# Patient Record
Sex: MALE | Race: WHITE | NOT HISPANIC OR LATINO | Employment: OTHER | URBAN - NONMETROPOLITAN AREA
[De-identification: names, ages, dates, MRNs, and addresses within clinical notes are randomized per-mention and may not be internally consistent; named-entity substitution may affect disease eponyms.]

---

## 2017-02-17 ENCOUNTER — OFFICE VISIT (OUTPATIENT)
Dept: RETAIL CLINIC | Facility: CLINIC | Age: 62
End: 2017-02-17

## 2017-02-17 VITALS — TEMPERATURE: 98.3 F | HEART RATE: 79 BPM | OXYGEN SATURATION: 97 % | WEIGHT: 200.6 LBS | RESPIRATION RATE: 18 BRPM

## 2017-02-17 DIAGNOSIS — J01.00 ACUTE MAXILLARY SINUSITIS, RECURRENCE NOT SPECIFIED: Primary | ICD-10-CM

## 2017-02-17 PROCEDURE — 99213 OFFICE O/P EST LOW 20 MIN: CPT | Performed by: NURSE PRACTITIONER

## 2017-02-17 RX ORDER — AMOXICILLIN 875 MG/1
875 TABLET, COATED ORAL 2 TIMES DAILY
Qty: 20 TABLET | Refills: 0 | Status: SHIPPED | OUTPATIENT
Start: 2017-02-17 | End: 2017-02-27

## 2017-02-17 RX ORDER — METHYLPREDNISOLONE 4 MG/1
TABLET ORAL
Qty: 21 TABLET | Refills: 0 | Status: SHIPPED | OUTPATIENT
Start: 2017-02-17 | End: 2019-09-10

## 2017-02-17 NOTE — PATIENT INSTRUCTIONS
You have been diagnosed with a sinus infection.  An antibiotic has been prescribed and should be taken until completely gone.  While on an antibiotic it is recommended that you take a form of probiotic such as yogurt or a pill form each day.  Use saline nasal spray for congestion  mucinex dm will help with congestion and cough  You should follow up with your family doctor, dr. Cristofer arias, if fever, increasing sinus pain or worsening.

## 2017-02-17 NOTE — PROGRESS NOTES
Subjective   Phill Madison is a 61 y.o. male.   Chief Complaint   Patient presents with   • Sinusitis      History of Present Illness   Several days with sinus drainage, bladimir and cough.  Much pressure in cheek area.  He has had no fever.  Hx of recurrent sinus infections.  Last was in 12/16.  He is taking no meds for the symptoms.  He has had no nvd  He has a hx of allergies.    The following portions of the patient's history were reviewed and updated as appropriate: allergies, current medications, past family history, past medical history, past social history, past surgical history and problem list.    Review of Systems   General:  Neg for fever  Neg for fatigue  Pos for illness exposure  Hent:  Pos for sinus bladimir and drainage  Pos for pain in sinuses  Neg for ear pain   Neg for sore throat  Lungs;  Pos for cough  Neg for sob  Pos for hx of asthma  Gi:  Neg for nvd  Neg for change in appetite    Objective   Allergies   Allergen Reactions   • Sulfa Antibiotics Hives       Physical Exam   General:  Awake and alert  nad  Does not appear acutely ill  Hent:  tms intact  No ejection  Light reflex present  Post pharynx with mild ejection.  No exudate or swelling  Uvula midline    Neck:  Supple  No lymphadenopathy  Lungs;  cta  No use of accessory muscles  No ext cyanosis  Card:  ahr with rrr  No ectopy  No jvd  Skin:  Warm and dry  No cyanosis    Assessment/Plan   Phill was seen today for sinusitis.    Diagnoses and all orders for this visit:    Acute maxillary sinusitis, recurrence not specified  -     amoxicillin (AMOXIL) 875 MG tablet; Take 1 tablet by mouth 2 (Two) Times a Day for 10 days.  -     MethylPREDNISolone (MEDROL, SANDY,) 4 MG tablet; Take as directed on package instructions.

## 2019-09-10 ENCOUNTER — OFFICE VISIT (OUTPATIENT)
Dept: CARDIOLOGY | Facility: CLINIC | Age: 64
End: 2019-09-10

## 2019-09-10 VITALS
OXYGEN SATURATION: 97 % | HEIGHT: 71 IN | HEART RATE: 60 BPM | DIASTOLIC BLOOD PRESSURE: 69 MMHG | BODY MASS INDEX: 26.46 KG/M2 | SYSTOLIC BLOOD PRESSURE: 113 MMHG | WEIGHT: 189 LBS

## 2019-09-10 DIAGNOSIS — I45.10 RBBB: ICD-10-CM

## 2019-09-10 DIAGNOSIS — R55 SYNCOPE AND COLLAPSE: ICD-10-CM

## 2019-09-10 DIAGNOSIS — R00.2 PALPITATIONS: ICD-10-CM

## 2019-09-10 DIAGNOSIS — E78.2 MIXED HYPERLIPIDEMIA: Primary | ICD-10-CM

## 2019-09-10 DIAGNOSIS — R06.09 DYSPNEA ON EXERTION: ICD-10-CM

## 2019-09-10 PROCEDURE — 93000 ELECTROCARDIOGRAM COMPLETE: CPT | Performed by: INTERNAL MEDICINE

## 2019-09-10 PROCEDURE — 99204 OFFICE O/P NEW MOD 45 MIN: CPT | Performed by: INTERNAL MEDICINE

## 2019-09-10 RX ORDER — DILTIAZEM HYDROCHLORIDE 120 MG/1
120 CAPSULE, COATED, EXTENDED RELEASE ORAL DAILY
Refills: 6 | COMMUNITY
Start: 2019-08-30 | End: 2019-09-10

## 2019-09-10 RX ORDER — DICYCLOMINE HCL 20 MG
20 TABLET ORAL 3 TIMES DAILY PRN
Refills: 1 | COMMUNITY
Start: 2019-08-01 | End: 2020-01-27

## 2019-09-10 RX ORDER — PANTOPRAZOLE SODIUM 20 MG/1
20 TABLET, DELAYED RELEASE ORAL DAILY
Refills: 4 | COMMUNITY
Start: 2019-08-30 | End: 2020-01-27

## 2019-09-10 NOTE — PATIENT INSTRUCTIONS
"Fat and Cholesterol Restricted Eating Plan  Getting too much fat and cholesterol in your diet may cause health problems. Choosing the right foods helps keep your fat and cholesterol at normal levels. This can keep you from getting certain diseases.  Your doctor may recommend an eating plan that includes:  · Total fat: ______% or less of total calories a day.  · Saturated fat: ______% or less of total calories a day.  · Cholesterol: less than _________mg a day.  · Fiber: ______g a day.  What are tips for following this plan?  General tips    · Work with your doctor to lose weight if you need to.  · Avoid:  ? Foods with added sugar.  ? Fried foods.  ? Foods with partially hydrogenated oils.  · Limit alcohol intake to no more than 1 drink a day for nonpregnant women and 2 drinks a day for men. One drink equals 12 oz of beer, 5 oz of wine, or 1½ oz of hard liquor.  Reading food labels  · Check food labels for:  ? Trans fats.  ? Partially hydrogenated oils.  ? Saturated fat (g) in each serving.  ? Cholesterol (mg) in each serving.  ? Fiber (g) in each serving.  · Choose foods with healthy fats, such as:  ? Monounsaturated fats.  ? Polyunsaturated fats.  ? Omega-3 fats.  · Choose grain products that have whole grains. Look for the word \"whole\" as the first word in the ingredient list.  Cooking  · Cook foods using low-fat methods. These include baking, boiling, grilling, and broiling.  · Eat more home-cooked foods. Eat at restaurants and buffets less often.  · Avoid cooking using saturated fats, such as butter, cream, palm oil, palm kernel oil, and coconut oil.  Meal planning    · At meals, divide your plate into four equal parts:  ? Fill one-half of your plate with vegetables and green salads.  ? Fill one-fourth of your plate with whole grains.  ? Fill one-fourth of your plate with low-fat (lean) protein foods.  · Eat fish that is high in omega-3 fats at least two times a week. This includes mackerel, tuna, sardines, and " salmon.  · Eat foods that are high in fiber, such as whole grains, beans, apples, broccoli, carrots, peas, and barley.  Recommended foods  Grains  · Whole grains, such as whole wheat or whole grain breads, crackers, cereals, and pasta. Unsweetened oatmeal, bulgur, barley, quinoa, or brown rice. Corn or whole wheat flour tortillas.  Vegetables  · Fresh or frozen vegetables (raw, steamed, roasted, or grilled). Green salads.  Fruits  · All fresh, canned (in natural juice), or frozen fruits.  Meats and other protein foods  · Ground beef (85% or leaner), grass-fed beef, or beef trimmed of fat. Skinless chicken or turkey. Ground chicken or turkey. Pork trimmed of fat. All fish and seafood. Egg whites. Dried beans, peas, or lentils. Unsalted nuts or seeds. Unsalted canned beans. Nut butters without added sugar or oil.  Dairy  · Low-fat or nonfat dairy products, such as skim or 1% milk, 2% or reduced-fat cheeses, low-fat and fat-free ricotta or cottage cheese, or plain low-fat and nonfat yogurt.  Fats and oils  · Tub margarine without trans fats. Light or reduced-fat mayonnaise and salad dressings. Avocado. Olive, canola, sesame, or safflower oils.  The items listed above may not be a complete list of recommended foods or beverages. Contact your dietitian for more options.  The items listed above may not be a complete list of foods and beverages [you/your child] can eat. Contact a dietitian for more information.  Foods to avoid  Grains  · White bread. White pasta. White rice. Cornbread. Bagels, pastries, and croissants. Crackers and snack foods that contain trans fat and hydrogenated oils.  Vegetables  · Vegetables cooked in cheese, cream, or butter sauce. Fried vegetables.  Fruits  · Canned fruit in heavy syrup. Fruit in cream or butter sauce. Fried fruit.  Meats and other protein foods  · Fatty cuts of meat. Ribs, chicken wings, pastrana, sausage, bologna, salami, chitterlings, fatback, hot dogs, bratwurst, and packaged  lunch meats. Liver and organ meats. Whole eggs and egg yolks. Chicken and turkey with skin. Fried meat.  Dairy  · Whole or 2% milk, cream, half-and-half, and cream cheese. Whole milk cheeses. Whole-fat or sweetened yogurt. Full-fat cheeses. Nondairy creamers and whipped toppings. Processed cheese, cheese spreads, and cheese curds.  Beverages  · Alcohol. Sugar-sweetened drinks such as sodas, lemonade, and fruit drinks.  Fats and oils  · Butter, stick margarine, lard, shortening, ghee, or pastrana fat. Coconut, palm kernel, and palm oils.  Sweets and desserts  · Corn syrup, sugars, honey, and molasses. Candy. Jam and jelly. Syrup. Sweetened cereals. Cookies, pies, cakes, donuts, muffins, and ice cream.  The items listed above may not be a complete list of foods and beverages to avoid. Contact your dietitian for more information.  The items listed above may not be a complete list of foods and beverages [you/your child] should avoid. Contact a dietitian for more information.  Summary  · Choosing the right foods helps keep your fat and cholesterol at normal levels. This can keep you from getting certain diseases.  · At meals, fill one-half of your plate with vegetables and green salads.  · Eat high-fiber foods, like whole grains, beans, apples, carrots, peas, and barley.  · Limit added sugar, saturated fats, alcohol, and fried foods.  This information is not intended to replace advice given to you by your health care provider. Make sure you discuss any questions you have with your health care provider.  Document Released: 06/18/2013 Document Revised: 09/04/2018 Document Reviewed: 09/04/2018  [employer] Interactive Patient Education © 2019 [employer]  BMI for Adults    Body mass index (BMI) is a number that is calculated from a person's weight and height. BMI may help to estimate how much of a person's weight is composed of fat. BMI can help identify those who may be at higher risk for certain medical problems.  How is BMI  "used with adults?  BMI is used as a screening tool to identify possible weight problems. It is used to check whether a person is obese, overweight, healthy weight, or underweight.  How is BMI calculated?  BMI measures your weight and compares it to your height. This can be done either in English ([place] or metric measurements. Note that charts are available to help you find your BMI quickly and easily without having to do these calculations yourself.  To calculate your BMI in English ([place] measurements, your health care provider will:  1. Measure your weight in pounds (lb).  2. Multiply the number of pounds by 703.  ? For example, for a person who weighs 180 lb, multiply that number by 703, which equals 126,540.  3. Measure your height in inches (in). Then multiply that number by itself to get a measurement called \"inches squared.\"  ? For example, for a person who is 70 in tall, the \"inches squared\" measurement is 70 in x 70 in, which equals 4900 inches squared.  4. Divide the total from Step 2 (number of lb x 703) by the total from Step 3 (inches squared): 126,540 ÷ 4900 = 25.8. This is your BMI.  To calculate your BMI in metric measurements, your health care provider will:  1. Measure your weight in kilograms (kg).  2. Measure your height in meters (m). Then multiply that number by itself to get a measurement called \"meters squared.\"  ? For example, for a person who is 1.75 m tall, the \"meters squared\" measurement is 1.75 m x 1.75 m, which is equal to 3.1 meters squared.  3. Divide the number of kilograms (your weight) by the meters squared number. In this example: 70 ÷ 3.1 = 22.6. This is your BMI.  How is BMI interpreted?  To interpret your results, your health care provider will use BMI charts to identify whether you are underweight, normal weight, overweight, or obese. The following guidelines will be used:  · Underweight: BMI less than 18.5.  · Normal weight: BMI between 18.5 and 24.9.  · Overweight: BMI " between 25 and 29.9.  · Obese: BMI of 30 and above.  Please note:  · Weight includes both fat and muscle, so someone with a muscular build, such as an athlete, may have a BMI that is higher than 24.9. In cases like these, BMI is not an accurate measure of body fat.  · To determine if excess body fat is the cause of a BMI of 25 or higher, further assessments may need to be done by a health care provider.  · BMI is usually interpreted in the same way for men and women.  Why is BMI a useful tool?  BMI is useful in two ways:  · Identifying a weight problem that may be related to a medical condition, or that may increase the risk for medical problems.  · Promoting lifestyle and diet changes in order to reach a healthy weight.  Summary  · Body mass index (BMI) is a number that is calculated from a person's weight and height.  · BMI may help to estimate how much of a person's weight is composed of fat. BMI can help identify those who may be at higher risk for certain medical problems.  · BMI can be measured using English measurements or metric measurements.  · To interpret your results, your health care provider will use BMI charts to identify whether you are underweight, normal weight, overweight, or obese.  This information is not intended to replace advice given to you by your health care provider. Make sure you discuss any questions you have with your health care provider.  Document Released: 08/29/2005 Document Revised: 10/31/2018 Document Reviewed: 10/31/2018  [employer] Interactive Patient Education © 2019 [employer]

## 2019-09-10 NOTE — PROGRESS NOTES
Subjective   Phill Madison is a 64 y.o. male     Chief Complaint   Patient presents with   • Establish Care     Here to establish care    • Hyperlipidemia   • Palpitations       PROBLEM LIST:     1. Hyperlipidemia  2. VALLE  3. Palpitations  4. Syncope and collapse, last episode approx. 2 yrs. ago  5. Asthma 25 yrs. ago, reports cured with acupuncture  6. RBBB per EKG                      Specialty Problems     None            HPI:  Mr. Phill Madison is a 64-year-old male patient of Luci Singh who presents today for second opinion.    Proximally 2 years ago the patient had an episode of syncope.  He was in his basement, felt a rapid graying out, and awoke on the floor.  There is no antecedent palpitation, chest pain, or dyspnea.  The patient suffered no injury.  He awoke without Los's paralysis or post ictal state.  He had no history of fainting child, no orthostatic dizziness, and he has had no symptoms since that episode.  Mr. Madison saw Dr. Afshan Everett who performed echocardiography, stress testing, and event monitor.  The only information the patient can relate is that he was told he had a valve it leaked.  Mr. Madison was ultimately sent for tilt table testing which, per his report, was positive.  Apparently he was started on diltiazem in that timeframe.    Mr. Madison has had no dizziness, presyncope, or syncope since the index event described above.  Additionally, he denies chest pain, orthopnea, PND, or lower extremity edema.  He denies palpitations, symptoms of arterial embolic events, or symptoms of peripheral arterial disease.  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  He states that he is somewhat more fatigued at the end of the day than he was several years ago.  However, he relates this to his diltiazem use.  Mr. Madison describes that he feels generalized anergy and fatigue since diltiazem was started.  The patient was  never diagnosed with hypertension prior to the initiation of that medication.  Therefore, presumptively, eye exam was initiated either because of tilt table findings or event monitor although the patient cannot describe being told he had any abnormal heart rhythms.                      CURRENT MEDICATION:    Current Outpatient Medications   Medication Sig Dispense Refill   • dicyclomine (BENTYL) 20 MG tablet Take 20 mg by mouth 3 (Three) Times a Day As Needed.  1   • diltiaZEM CD (CARDIZEM CD) 120 MG 24 hr capsule Take 120 mg by mouth Daily.  6   • pantoprazole (PROTONIX) 20 MG EC tablet Take 20 mg by mouth Daily.  4     No current facility-administered medications for this visit.        ALLERGIES:    Sulfa antibiotics    PAST MEDICAL HISTORY:    Past Medical History:   Diagnosis Date   • Allergic    • Arthritis    • Asthma    • VALLE (dyspnea on exertion)    • Hyperlipidemia    • Palpitation    • Syncope and collapse        SURGICAL HISTORY:    Past Surgical History:   Procedure Laterality Date   • COLONOSCOPY     • FOOT SURGERY     • ROTATOR CUFF REPAIR      x 2       SOCIAL HISTORY:    Social History     Socioeconomic History   • Marital status:      Spouse name: Not on file   • Number of children: Not on file   • Years of education: Not on file   • Highest education level: Not on file   Tobacco Use   • Smoking status: Never Smoker   Substance and Sexual Activity   • Alcohol use: No   • Drug use: No       FAMILY HISTORY:    Family History   Problem Relation Age of Onset   • Heart disease Father    • Diabetes Father    • Heart disease Mother        Review of Systems   Constitutional: Positive for fatigue (Less stamina in the last 2-3 years since starting on Diltiazem ) and unexpected weight change (weight loss over 6 mo. r/t ? IBS). Negative for chills and fever.   HENT: Negative.    Eyes: Positive for visual disturbance (glasses daily ).   Respiratory: Positive for shortness of breath (SOA with exertion such  "as climbing stairs. Started about 2 years ago). Negative for chest tightness.         Denies orthopnea/PND   Cardiovascular: Positive for palpitations (Occasional palps for 2-3 years ). Negative for chest pain and leg swelling.   Gastrointestinal: Negative.  Negative for blood in stool (no melena,hematuria,hemoptysis,hematochezia), constipation and diarrhea.        Urgency for BM after eating   Endocrine: Negative.  Negative for cold intolerance and heat intolerance.   Genitourinary: Positive for difficulty urinating (low stream at times ). Negative for hematuria.   Musculoskeletal: Positive for arthralgias (joints ). Negative for back pain.        Leg cramps in bed, denies with ambulation   Skin: Negative.  Negative for rash and wound.   Allergic/Immunologic: Negative.    Neurological: Positive for light-headedness (when standing fast). Negative for dizziness and weakness.        First started seeing Dr. Afshan Everett after a syncopal episode a few years ago. No syncopal episodes since then.   Denies stroke like sx's   Hematological: Negative.    Psychiatric/Behavioral: Negative.  Negative for sleep disturbance (Denies waking with smothering ).       VISIT VITALS:  Vitals:    09/10/19 1304   BP: 113/69   BP Location: Left arm   Patient Position: Sitting   Pulse: 60   SpO2: 97%   Weight: 85.7 kg (189 lb)   Height: 180.3 cm (71\")      /69 (BP Location: Left arm, Patient Position: Sitting)   Pulse 60   Ht 180.3 cm (71\")   Wt 85.7 kg (189 lb)   SpO2 97%   BMI 26.36 kg/m²     RECENT LABS:    Objective       Physical Exam   Constitutional: He is oriented to person, place, and time. He appears well-developed and well-nourished. No distress.   HENT:   Head: Normocephalic and atraumatic.   No oral lesions   Eyes: Conjunctivae are normal. Pupils are equal, round, and reactive to light. Left eye exhibits nystagmus.   No ptosis or lid lag  Extra ocular motions intact  CORRY  3-4 beats nystaguym on left lateral gaze   "   Neck: Normal range of motion. Neck supple. No hepatojugular reflux and no JVD present. Carotid bruit is not present. No tracheal deviation present.   Nl. Carotid upstrokes     Cardiovascular: Normal rate, regular rhythm, S1 normal, S2 normal and intact distal pulses. Exam reveals gallop and S4 (very soft). Exam reveals no S3 and no friction rub.   No murmur heard.  Pulses:       Radial pulses are 2+ on the right side, and 2+ on the left side.   Pulmonary/Chest: Effort normal and breath sounds normal. He has no wheezes. He has no rhonchi. He has no rales.   Nl. Expir. Phase  Nl. Breath sound intensity       Abdominal: Soft. Bowel sounds are normal. He exhibits no distension, no abdominal bruit and no mass. There is no tenderness. There is no rebound and no guarding.   No organomegaly   Musculoskeletal: Normal range of motion. He exhibits no edema, tenderness or deformity.   LLE, No edema, nl. Cap. Refill,  Nl. Pedal pulses  RLE, No edema, nl. Cap. Refill,  Nl. Pedal pulses   Neurological: He is alert and oriented to person, place, and time.   Skin: Skin is warm and dry. No rash noted. No erythema. No pallor.   Psychiatric: He has a normal mood and affect. His behavior is normal. Judgment and thought content normal.   Nursing note and vitals reviewed.        ECG 12 Lead  Date/Time: 9/10/2019 1:33 PM  Performed by: Seth Davidson MD  Authorized by: Seth Davidson MD   Comparison: not compared with previous ECG   Previous ECG: no previous ECG available  Comments: Sinus bradycardia at 53 bpm.  Right bundle branch block with normal axis.  Minor intra-atrial conduction delay.              Assessment/Plan   #1.  Syncope.  The patient had a singular isolated event of unknown cause.  Per his report he had a positive tilt table test but he denies ever having any symptoms to suggest neurocardiogenic events.  Therefore, I doubt that the tilt table was a true positive study.    2.  Energy and fatigue since starting  diltiazem.  I think that it would be reasonable to stop that medication and follow symptoms and blood pressures closely.  Mr. Madison was never hypertensive prior to the use of that medication, and unless he had a very unusual rhythm on event monitor (i.e. RV outflow tract V. tach etc.) I see no indication for its use.  Therefore, Mr. Madison will discontinue that medication and follow blood pressures over the next several weeks calling results to us.    3.  The patient's physical exam is entirely normal today.  Therefore    4.  Mr. Madison will follow with Luci Singh as instructed, and he will follow in our office on a as needed basis for symptoms or blood pressures as discussed in detail today.  I do not believe the patient needs to restrict his physical activity in any way.   Diagnosis Plan   1. Mixed hyperlipidemia     2. Dyspnea on exertion     3. Palpitations  ECG 12 Lead   4. Syncope and collapse  ECG 12 Lead   5. RBBB         No Follow-up on file.       Patient's Body mass index is 26.36 kg/m². BMI is above normal parameters. Recommendations include: educational material and referral to primary care.       Heidy Lester LPN    Scribed for Dr. Seth Davidson by Heidy Lester LPN September 10, 2019 1:51 PM         Electronically signed by:            This note is dictated utilizing voice recognition software.  Although this record has been proof read, transcriptional errors may still be present. If questions occur regarding the content of this record please do not hesitate to call our office.

## 2019-09-30 ENCOUNTER — DOCUMENTATION (OUTPATIENT)
Dept: CARDIOLOGY | Facility: CLINIC | Age: 64
End: 2019-09-30

## 2020-01-27 ENCOUNTER — OFFICE VISIT (OUTPATIENT)
Dept: CARDIOLOGY | Facility: CLINIC | Age: 65
End: 2020-01-27

## 2020-01-27 VITALS
OXYGEN SATURATION: 96 % | BODY MASS INDEX: 28.06 KG/M2 | DIASTOLIC BLOOD PRESSURE: 81 MMHG | SYSTOLIC BLOOD PRESSURE: 132 MMHG | WEIGHT: 200.4 LBS | HEART RATE: 68 BPM | HEIGHT: 71 IN

## 2020-01-27 DIAGNOSIS — E78.2 MIXED HYPERLIPIDEMIA: Primary | ICD-10-CM

## 2020-01-27 DIAGNOSIS — R06.09 DYSPNEA ON EXERTION: ICD-10-CM

## 2020-01-27 DIAGNOSIS — R55 SYNCOPE AND COLLAPSE: ICD-10-CM

## 2020-01-27 DIAGNOSIS — R00.2 PALPITATIONS: ICD-10-CM

## 2020-01-27 DIAGNOSIS — N52.9 ERECTILE DYSFUNCTION, UNSPECIFIED ERECTILE DYSFUNCTION TYPE: ICD-10-CM

## 2020-01-27 DIAGNOSIS — I45.10 RBBB: ICD-10-CM

## 2020-01-27 PROCEDURE — 99213 OFFICE O/P EST LOW 20 MIN: CPT | Performed by: INTERNAL MEDICINE

## 2020-01-27 RX ORDER — TADALAFIL 20 MG/1
20 TABLET ORAL DAILY PRN
Qty: 15 TABLET | Refills: 2 | Status: SHIPPED | OUTPATIENT
Start: 2020-01-27 | End: 2022-04-15

## 2020-01-27 RX ORDER — TADALAFIL 5 MG/1
TABLET ORAL
COMMUNITY
Start: 2019-11-20 | End: 2020-01-27 | Stop reason: SDUPTHER

## 2020-01-27 RX ORDER — DILTIAZEM HYDROCHLORIDE 120 MG/1
CAPSULE, COATED, EXTENDED RELEASE ORAL
COMMUNITY
Start: 2019-12-18 | End: 2020-01-27

## 2020-01-27 NOTE — PATIENT INSTRUCTIONS
2 pt identifiers. PPD skin test, negative 0mm lt forearm. Pt with no complaints.   Obesity, Adult  Obesity is the condition of having too much total body fat. Being overweight or obese means that your weight is greater than what is considered healthy for your body size. Obesity is determined by a measurement called BMI. BMI is an estimate of body fat and is calculated from height and weight. For adults, a BMI of 30 or higher is considered obese.  Obesity can lead to other health concerns and major illnesses, including:  · Stroke.  · Coronary artery disease (CAD).  · Type 2 diabetes.  · Some types of cancer, including cancers of the colon, breast, uterus, and gallbladder.  · Osteoarthritis.  · High blood pressure (hypertension).  · High cholesterol.  · Sleep apnea.  · Gallbladder stones.  · Infertility problems.  What are the causes?  Common causes of this condition include:  · Eating daily meals that are high in calories, sugar, and fat.  · Being born with genes that may make you more likely to become obese.  · Having a medical condition that causes obesity, including:  ? Hypothyroidism.  ? Polycystic ovarian syndrome (PCOS).  ? Binge-eating disorder.  ? Cushing syndrome.  · Taking certain medicines, such as steroids, antidepressants, and seizure medicines.  · Not being physically active (sedentary lifestyle).  · Not getting enough sleep.  · Drinking high amounts of sugar-sweetened beverages, such as soft drinks.  What increases the risk?  The following factors may make you more likely to develop this condition:  · Having a family history of obesity.  · Being a woman of  descent.  · Being a man of  descent.  · Living in an area with limited access to:  ? [place], recreation centers, or sidewalks.  ? Healthy food choices, such as grocery stores and farmers' markets.  What are the signs or symptoms?  The main sign of this condition is having too much body fat.  How is this diagnosed?  This condition is diagnosed based on:  · Your BMI. If you are an adult with a BMI of 30 or  higher, you are considered obese.  · Your waist circumference. This measures the distance around your waistline.  · Your skinfold thickness. Your health care provider may gently pinch a fold of your skin and measure it.  You may have other tests to check for underlying conditions.  How is this treated?  Treatment for this condition often includes changing your lifestyle. Treatment may include some or all of the following:  · Dietary changes. This may include developing a healthy meal plan.  · Regular physical activity. This may include activity that causes your heart to beat faster (aerobic exercise) and strength training. Work with your health care provider to design an exercise program that works for you.  · Medicine to help you lose weight if you are unable to lose 1 pound a week after 6 weeks of healthy eating and more physical activity.  · Treating conditions that cause the obesity (underlying conditions).  · Surgery. Surgical options may include gastric banding and gastric bypass. Surgery may be done if:  ? Other treatments have not helped to improve your condition.  ? You have a BMI of 40 or higher.  ? You have life-threatening health problems related to obesity.  Follow these instructions at home:  Eating and drinking    · Follow recommendations from your health care provider about what you eat and drink. Your health care provider may advise you to:  ? Limit fast food, sweets, and processed snack foods.  ? Choose low-fat options, such as low-fat milk instead of whole milk.  ? Eat 5 or more servings of fruits or vegetables every day.  ? Eat at home more often. This gives you more control over what you eat.  ? Choose healthy foods when you eat out.  ? Learn to read food labels. This will help you understand how much food is considered 1 serving.  ? Learn what a healthy serving size is.  ? Keep low-fat snacks available.  ? Limit sugary drinks, such as soda, fruit juice, sweetened iced tea, and flavored  milk.  · Drink enough water to keep your urine pale yellow.  · Do not follow a fad diet. Fad diets can be unhealthy and even dangerous.  Physical activity  · Exercise regularly, as told by your health care provider.  ? Most adults should get up to 150 minutes of moderate-intensity exercise every week.  ? Ask your health care provider what types of exercise are safe for you and how often you should exercise.  · Warm up and stretch before being active.  · Cool down and stretch after being active.  · Rest between periods of activity.  Lifestyle  · Work with your health care provider and a dietitian to set a weight-loss goal that is healthy and reasonable for you.  · Limit your screen time.  · Find ways to reward yourself that do not involve food.  · Do not drink alcohol if:  ? Your health care provider tells you not to drink.  ? You are pregnant, may be pregnant, or are planning to become pregnant.  · If you drink alcohol:  ? Limit how much you use to:  § 0-1 drink a day for women.  § 0-2 drinks a day for men.  ? Be aware of how much alcohol is in your drink. In the [place] one drink equals one 12 oz bottle of beer (355 mL), one 5 oz glass of wine (148 mL), or one 1½ oz glass of hard liquor (44 mL).  General instructions  · Keep a weight-loss journal to keep track of the food you eat and how much exercise you get.  · Take over-the-counter and prescription medicines only as told by your health care provider.  · Take vitamins and supplements only as told by your health care provider.  · Consider joining a support group. Your health care provider may be able to recommend a support group.  · Keep all follow-up visits as told by your health care provider. This is important.  Contact a health care provider if:  · You are unable to meet your weight loss goal after 6 weeks of dietary and lifestyle changes.  Get help right away if you are having:  · Trouble breathing.  · Suicidal thoughts or behaviors.  Summary  · Obesity is the  condition of having too much total body fat.  · Being overweight or obese means that your weight is greater than what is considered healthy for your body size.  · Work with your health care provider and a dietitian to set a weight-loss goal that is healthy and reasonable for you.  · Exercise regularly, as told by your health care provider. Ask your health care provider what types of exercise are safe for you and how often you should exercise.  This information is not intended to replace advice given to you by your health care provider. Make sure you discuss any questions you have with your health care provider.  Document Released: 01/25/2006 Document Revised: 08/22/2019 Document Reviewed: 08/22/2019  [employer] Interactive Patient Education © 2019 [employer]  MyPlate from [place] [facility]    MyPlate is an outline of a general healthy diet based on the 2010 Dietary Guidelines for Americans, from the [place] Department of Agriculture (USDA). It sets guidelines for how much food you should eat from each food group based on your age, sex, and level of physical activity.  What are tips for following MyPlate?  To follow MyPlate recommendations:  · Eat a wide variety of fruits and vegetables, grains, and protein foods.  · Serve smaller portions and eat less food throughout the day.  · Limit portion sizes to avoid overeating.  · Enjoy your food.  · Get at least 150 minutes of exercise every week. This is about 30 minutes each day, 5 or more days per week.  It can be difficult to have every meal look like MyPlate. Think about MyPlate as eating guidelines for an entire day, rather than each individual meal.  Fruits and vegetables  · Make half of your plate fruits and vegetables.  · Eat many different colors of fruits and vegetables each day.  · For a 2,000 calorie daily food plan, eat:  ? 2½ cups of vegetables every day.  ? 2 cups of fruit every day.  · 1 cup is equal to:  ? 1 cup raw or cooked vegetables.  ? 1 cup raw fruit.  ? 1  medium-sized orange, apple, or banana.  ? 1 cup 100% fruit or vegetable juice.  ? 2 cups raw leafy greens, such as lettuce, spinach, or kale.  ? ½ cup dried fruit.  Grains  · One fourth of your plate should be grains.  · Make at least half of the grains you eat each day whole grains.  · For a 2,000 calorie daily food plan, eat 6 oz of grains every day.  · 1 oz is equal to:  ? 1 slice bread.  ? 1 cup cereal.  ? ½ cup cooked rice, cereal, or pasta.  Protein  · One fourth of your plate should be protein.  · Eat a wide variety of protein foods, including meat, poultry, fish, eggs, beans, nuts, and tofu.  · For a 2,000 calorie daily food plan, eat 5½ oz of protein every day.  · 1 oz is equal to:  ? 1 oz meat, poultry, or fish.  ? ¼ cup cooked beans.  ? 1 egg.  ? ½ oz nuts or seeds.  ? 1 Tbsp peanut butter.  Dairy  · Drink fat-free or low-fat (1%) milk.  · Eat or drink dairy as a side to meals.  · For a 2,000 calorie daily food plan, eat or drink 3 cups of dairy every day.  · 1 cup is equal to:  ? 1 cup milk, yogurt, cottage cheese, or soy milk (soy beverage).  ? 2 oz processed cheese.  ? 1½ oz natural cheese.  Fats, oils, salt, and sugars  · Only small amounts of oils are recommended.  · Avoid foods that are high in calories and low in nutritional value (empty calories), like foods high in fat or added sugars.  · Choose foods that are low in salt (sodium). Choose foods that have less than 140 milligrams (mg) of sodium per serving.  · Drink water instead of sugary drinks. Drink enough water each day to keep your urine pale yellow.  Where to find support  · Work with your health care provider or a nutrition specialist (dietitian) to develop a customized eating plan that is right for you.  · Download an esau (mobile application) to help you track your daily food intake.  Where to find more information  · Go to ChooseMyPlate.gov for more information.  · Learn more and log your daily food intake according to MyPlate using  USDA's SuperTracker: www.supertracker.usda.gov  Summary  · MyPlate is a general guideline for healthy eating from the USDA. It is based on the 2010 Dietary Guidelines for Americans.  · In general, fruits and vegetables should take up ½ of your plate, grains should take up ¼ of your plate, and protein should take up ¼ of your plate.  This information is not intended to replace advice given to you by your health care provider. Make sure you discuss any questions you have with your health care provider.  Document Released: 01/06/2009 Document Revised: 03/19/2018 Document Reviewed: 03/19/2018  [employer] Interactive Patient Education © 2019 [employer]

## 2020-01-27 NOTE — PROGRESS NOTES
Subjective   Phill Madison is a 64 y.o. male     Chief Complaint   Patient presents with   • Hyperlipidemia   • Palpitations   • Loss of Consciousness   • Shortness of Breath       PROBLEM LIST:     1. Hyperlipidemia  2. VALLE  3. Palpitations  4. Syncope and collapse, last episode approx. 2 yrs. Ago  4.1 Tlit Table, 8-1-17, pos. For neurocardiogenic syncope  5. Asthma 25 yrs. ago, reports cured with acupuncture  6. RBBB per EKG                        Specialty Problems        Cardiology Problems    Mixed hyperlipidemia        Palpitations        RBBB        Syncope and collapse                HPI:  Mr. Madison returns for follow-up on the above.    He stopped diltiazem as requested and felt that he had more strength and stamina.  He had less energy and fatigue.  He did notice rare episodes of sensed extrasystoles.  He had no spontaneous dizziness, presyncope, or syncope.  He has had mild orthostatic lightheadedness in the past which did not change.  Blood pressures remained very well controlled.    Mr. Madison also describes that his most recent use of Cialis did not resulted in improvement of erectile dysfunction.    I reviewed the patient's previous echo, stress test, and tilt table.  Notably, Mr. Madison had no symptoms when upright.  He developed dizziness with relative hypotension and bradycardia only when he was placed in the 45 degree position.  As described previously, he has never had symptoms compatible with neurocardiogenic syncope anytime in the past.                        PRIOR MEDICATIONS    Current Outpatient Medications on File Prior to Visit   Medication Sig Dispense Refill   • dilTIAZem CD (CARDIZEM CD) 120 MG 24 hr capsule Ordered daily takes most days     • Probiotic Product (PROBIOTIC PO) Take  by mouth 1 (One) Time Per Week.     • tadalafil (CIALIS) 5 MG tablet prn     • [DISCONTINUED] dicyclomine (BENTYL) 20 MG tablet Take 20 mg by mouth 3 (Three) Times a Day As Needed.  1   • [DISCONTINUED]  "pantoprazole (PROTONIX) 20 MG EC tablet Take 20 mg by mouth Daily.  4     No current facility-administered medications on file prior to visit.        ALLERGIES:    Sulfa antibiotics    PAST MEDICAL HISTORY:    Past Medical History:   Diagnosis Date   • Allergic    • Arthritis    • Asthma    • VALLE (dyspnea on exertion)    • Hyperlipidemia    • Palpitation    • Syncope and collapse        SURGICAL HISTORY:    Past Surgical History:   Procedure Laterality Date   • COLONOSCOPY     • FOOT SURGERY     • ROTATOR CUFF REPAIR      x 2       SOCIAL HISTORY:    Social History     Socioeconomic History   • Marital status:      Spouse name: Not on file   • Number of children: Not on file   • Years of education: Not on file   • Highest education level: Not on file   Tobacco Use   • Smoking status: Never Smoker   • Smokeless tobacco: Never Used   Substance and Sexual Activity   • Alcohol use: No   • Drug use: No       FAMILY HISTORY:    Family History   Problem Relation Age of Onset   • Heart disease Father    • Diabetes Father    • Heart disease Mother        Review of Systems   Constitutional: Positive for fatigue (than usual).   HENT: Negative.    Eyes: Positive for visual disturbance (wears glasses).   Respiratory: Positive for shortness of breath (with hills).    Cardiovascular: Positive for palpitations (left chest). Negative for chest pain and leg swelling.   Gastrointestinal: Negative.    Endocrine: Negative.    Genitourinary: Negative.    Musculoskeletal: Negative.    Skin: Negative.    Allergic/Immunologic: Negative.    Neurological: Positive for dizziness (occas.).   Hematological: Negative.    Psychiatric/Behavioral: Negative.        VISIT VITALS:  Vitals:    01/27/20 1108   BP: 132/81   BP Location: Left arm   Patient Position: Sitting   Pulse: 68   SpO2: 96%   Weight: 90.9 kg (200 lb 6.4 oz)   Height: 180.3 cm (70.98\")      /81 (BP Location: Left arm, Patient Position: Sitting)   Pulse 68   Ht 180.3 cm " "(70.98\")   Wt 90.9 kg (200 lb 6.4 oz)   SpO2 96%   BMI 27.96 kg/m²     RECENT LABS:    Objective       Physical Exam    Procedures      Assessment/Plan   #1.  Energy and fatigue.  The patient felt better off diltiazem and had no symptoms of neurocardiogenic presyncope or syncope.  He will stop that medication and follow blood pressures closely.    2.  We will prescribe Cialis 20 mg daily.  If medications are ineffective I would recommend urologic evaluation.    3.  Mr. Madison will report any symptoms of presyncope or syncope.  Otherwise, he will follow with Dr. Story as instructed, and in our office on a yearly PRN basis.   Diagnosis Plan   1. Mixed hyperlipidemia     2. Palpitations     3. RBBB     4. Syncope and collapse     5. Dyspnea on exertion       Stopping Diltiazem to see if fatigue and shortness of breath improves. Monitor b/p and h/r call with any sx's. MEGAN,LPN    No follow-ups on file.         Phill Madison  reports that he has never smoked. He has never used smokeless tobacco.. I have educated him on the risk of diseases from using tobacco products such as cancer, COPD and heart diease.               Patient's Body mass index is 27.96 kg/m². BMI is above normal parameters. Recommendations include: educational material and referral to primary care.       Heidy Lester LPN    Scribed for Dr. Seth Davidson by Heidy Lester LPN January 27, 2020 11:14 AM         Electronically signed by:            This note is dictated utilizing voice recognition software.  Although this record has been proof read, transcriptional errors may still be present. If questions occur regarding the content of this record please do not hesitate to call our office.     "

## 2020-01-29 ENCOUNTER — DOCUMENTATION (OUTPATIENT)
Dept: CARDIOLOGY | Facility: CLINIC | Age: 65
End: 2020-01-29

## 2020-01-29 NOTE — PROGRESS NOTES
NABILS PRESCRIBED AT LAST VISIR REQUIRES A PA THROUGH HIS INSURANCE. PER DR. LAU, DO NOT PA, HAVE FAMILY PCP ADDRESS/OTHER OPTIONS. CALLED AND L/M  NUMBER WITH ABOVE. PH,LPN

## 2021-01-27 ENCOUNTER — OFFICE VISIT (OUTPATIENT)
Dept: CARDIOLOGY | Facility: CLINIC | Age: 66
End: 2021-01-27

## 2021-01-27 VITALS
BODY MASS INDEX: 27.97 KG/M2 | SYSTOLIC BLOOD PRESSURE: 131 MMHG | HEIGHT: 71 IN | WEIGHT: 199.8 LBS | OXYGEN SATURATION: 98 % | DIASTOLIC BLOOD PRESSURE: 77 MMHG | HEART RATE: 69 BPM

## 2021-01-27 DIAGNOSIS — Z99.89 OSA ON CPAP: ICD-10-CM

## 2021-01-27 DIAGNOSIS — I45.10 RBBB: ICD-10-CM

## 2021-01-27 DIAGNOSIS — G47.33 OSA ON CPAP: ICD-10-CM

## 2021-01-27 DIAGNOSIS — R55 SYNCOPE AND COLLAPSE: ICD-10-CM

## 2021-01-27 DIAGNOSIS — R00.2 PALPITATIONS: ICD-10-CM

## 2021-01-27 DIAGNOSIS — E78.2 MIXED HYPERLIPIDEMIA: ICD-10-CM

## 2021-01-27 DIAGNOSIS — R06.09 DYSPNEA ON EXERTION: Primary | ICD-10-CM

## 2021-01-27 PROCEDURE — 99214 OFFICE O/P EST MOD 30 MIN: CPT | Performed by: INTERNAL MEDICINE

## 2021-01-27 PROCEDURE — 93000 ELECTROCARDIOGRAM COMPLETE: CPT | Performed by: INTERNAL MEDICINE

## 2021-01-27 NOTE — PROGRESS NOTES
Subjective   Phill Madison is a 65 y.o. male     Chief Complaint   Patient presents with   • Follow-up     Here for yearly f/u   • Hyperlipidemia   • Loss of Consciousness       PROBLEM LIST:     1. Hyperlipidemia  2. VALLE  3. Palpitations  4. Syncope and collapse, last episode approx. 2 yrs. Ago  4.1 Tlit Table, 8-1-17, pos. For neurocardiogenic syncope  5. Asthma 25 yrs. ago, reports cured with acupuncture  6. RBBB per EKG  7. CODY, uses CPAP      Specialty Problems        Cardiology Problems    Mixed hyperlipidemia        Palpitations        RBBB        Syncope and collapse                HPI:  Mr. Madison returns for follow-up on the above.    He has done well since the time of his last visit.  He feels that his mood and energy have improved over the last year.  He still has occasional sensed extrasystoles and short runs, and he has occasional episodes of lightheadedness which are not orthostatic, positional, nor are they associated with palpitations.  Symptoms have been stable over years.    Mr. Madison specifically denies symptoms compatible with angina.  He also denies orthopnea, PND, or lower extremity edema.  He has had no symptoms of arterial embolic events to include no symptoms of TIA or stroke, and he has had no bleeding.  Blood pressures have been controlled.                        PRIOR MEDICATIONS    Current Outpatient Medications on File Prior to Visit   Medication Sig Dispense Refill   • Probiotic Product (PROBIOTIC PO) Take  by mouth 1 (One) Time Per Week.     • tadalafil (CIALIS) 20 MG tablet Take 1 tablet by mouth Daily As Needed for Erectile Dysfunction. prn 15 tablet 2     No current facility-administered medications on file prior to visit.        ALLERGIES:    Sulfa antibiotics    PAST MEDICAL HISTORY:    Past Medical History:   Diagnosis Date   • Allergic    • Arthritis    • Asthma    • VALLE (dyspnea on exertion)    • Hyperlipidemia    • Palpitation    • Syncope and collapse        SURGICAL  "HISTORY:    Past Surgical History:   Procedure Laterality Date   • COLONOSCOPY     • FOOT SURGERY     • ROTATOR CUFF REPAIR      x 2       SOCIAL HISTORY:    Social History     Socioeconomic History   • Marital status:      Spouse name: Not on file   • Number of children: Not on file   • Years of education: Not on file   • Highest education level: Not on file   Tobacco Use   • Smoking status: Never Smoker   • Smokeless tobacco: Never Used   Substance and Sexual Activity   • Alcohol use: No   • Drug use: No       FAMILY HISTORY:    Family History   Problem Relation Age of Onset   • Heart disease Father    • Diabetes Father    • Heart disease Mother        Review of Systems   Constitutional:        Improved strength and stamina compared to a year ago   HENT: Negative.    Eyes: Positive for visual disturbance (glasses).   Respiratory: Positive for shortness of breath (with hills).         Denies orthopnea/PND   Cardiovascular: Positive for palpitations (occas. flutter). Negative for chest pain and leg swelling.   Gastrointestinal: Negative.    Endocrine: Negative.    Genitourinary: Negative.    Musculoskeletal: Positive for arthralgias and myalgias.   Skin: Negative.    Allergic/Immunologic: Negative.    Neurological: Positive for light-headedness (occas.).   Hematological: Negative.    Psychiatric/Behavioral: Negative.        VISIT VITALS:  Vitals:    01/27/21 0926   BP: 131/77   BP Location: Left arm   Patient Position: Sitting   Pulse: 69   SpO2: 98%   Weight: 90.6 kg (199 lb 12.8 oz)   Height: 180.3 cm (71\")      /77 (BP Location: Left arm, Patient Position: Sitting)   Pulse 69   Ht 180.3 cm (71\")   Wt 90.6 kg (199 lb 12.8 oz)   SpO2 98%   BMI 27.87 kg/m²     RECENT LABS:    Objective       Physical Exam  Vitals signs and nursing note reviewed.   Constitutional:       General: He is not in acute distress.     Appearance: He is well-developed.   HENT:      Head: Normocephalic and atraumatic. "   Eyes:      Conjunctiva/sclera: Conjunctivae normal.      Pupils: Pupils are equal, round, and reactive to light.   Neck:      Musculoskeletal: Normal range of motion and neck supple.      Vascular: No carotid bruit, hepatojugular reflux or JVD.      Trachea: No tracheal deviation.      Comments: Nl. Carotid upstrokes  Cardiovascular:      Rate and Rhythm: Normal rate and regular rhythm. Occasional extrasystoles are present.     Pulses:           Radial pulses are 2+ on the right side and 2+ on the left side.      Heart sounds: S1 normal and S2 normal. No murmur. No friction rub. Gallop present. No S3 or S4 sounds.    Pulmonary:      Effort: Pulmonary effort is normal.      Breath sounds: Normal breath sounds. No wheezing, rhonchi or rales.      Comments: Nl. Expir. Phase  Nl. Breath sound intensity  Abdominal:      General: Bowel sounds are normal. There is no distension or abdominal bruit.      Palpations: Abdomen is soft. There is no mass.      Tenderness: There is no abdominal tenderness. There is no guarding or rebound.      Comments: No organomegaly   Musculoskeletal: Normal range of motion.         General: No tenderness or deformity.      Comments: BLE, no edema, sock imprint, palpable pedal pulses     Skin:     General: Skin is warm and dry.      Coloration: Skin is not pale.      Findings: No erythema or rash.   Neurological:      Mental Status: He is alert and oriented to person, place, and time.   Psychiatric:         Behavior: Behavior normal.         Thought Content: Thought content normal.         Judgment: Judgment normal.           ECG 12 Lead    Date/Time: 1/27/2021 11:09 AM  Performed by: Seth Davidson MD  Authorized by: Seth Davidson MD   Comparison: compared with previous ECG from 9/10/2019  Similar to previous ECG  Comments: sinus rhythm at 57.  Right bundle branch block with a QRS axis of 95.  Occasional PVCs.  No significant change from prior except for  ectopy.              Assessment/Plan   #1.  Palpitations.  These are not associated with symptoms of cerebral hypoperfusion, of systemic hypotension, and they do not produce chest pain or dyspnea.  Given the patient's prior work-up and stable symptomatology I do not think that further evaluation is indicated at this time.    2.  History of syncope.  The patient has had no syncope or presyncope since the time of his last visit.  The mild episodic dizziness he still experiences is not positional or orthostatic.  We will continue clinical monitoring.    3.  Palpitations.  The patient has isolated ventricular ectopic beats with no sustained dysrhythmia on today's EKG and symptoms have remained stable over time.  With no evidence of ischemia and normal LV systolic function on prior evaluation, and no relationship between ectopy and symptoms, I think the clinical monitoring only are indicated at this juncture.    4.  We discussed today symptoms of cerebral hypoperfusion in general, TIA or stroke, heart failure, ischemia, and malignant dysrhythmia.  The patient report any symptoms possibly representing the above.  Otherwise he will follow with Dr. Joey arias as instructed, and in our office in 1 year   Diagnosis Plan   1. Dyspnea on exertion     2. Mixed hyperlipidemia     3. Palpitations     4. RBBB     5. Syncope and collapse     6. CODY on CPAP         No follow-ups on file.         Phill Madison  reports that he has never smoked. He has never used smokeless tobacco.. I have educated him on the risk of diseases from using tobacco products such as cancer, COPD and heart disease.       Advance Care Planning   ACP discussion was held with the patient during this visit. Patient has an advance directive (not in EMR), copy requested.Living will, copy requested.         Patient's Body mass index is 27.87 kg/m². BMI is above normal parameters. Recommendations include: educational material and referral to primary care.        Heidy Lester LPN    Scribed for Dr. Seth Davidson by Heidy Lester LPN January 27, 2021 11:05 EST         Electronically signed by:            This note is dictated utilizing voice recognition software.  Although this record has been proof read, transcriptional errors may still be present. If questions occur regarding the content of this record please do not hesitate to call our office.

## 2022-03-08 ENCOUNTER — OFFICE VISIT (OUTPATIENT)
Dept: CARDIOLOGY | Facility: CLINIC | Age: 67
End: 2022-03-08

## 2022-03-08 VITALS
OXYGEN SATURATION: 98 % | HEART RATE: 62 BPM | HEIGHT: 70 IN | DIASTOLIC BLOOD PRESSURE: 77 MMHG | SYSTOLIC BLOOD PRESSURE: 117 MMHG | WEIGHT: 192.4 LBS | BODY MASS INDEX: 27.54 KG/M2

## 2022-03-08 DIAGNOSIS — I45.10 RBBB: ICD-10-CM

## 2022-03-08 DIAGNOSIS — R55 SYNCOPE AND COLLAPSE: ICD-10-CM

## 2022-03-08 DIAGNOSIS — R07.2 PRECORDIAL PAIN: ICD-10-CM

## 2022-03-08 DIAGNOSIS — R06.09 DYSPNEA ON EXERTION: ICD-10-CM

## 2022-03-08 DIAGNOSIS — E78.2 MIXED HYPERLIPIDEMIA: Primary | ICD-10-CM

## 2022-03-08 DIAGNOSIS — Z99.89 OSA ON CPAP: ICD-10-CM

## 2022-03-08 DIAGNOSIS — R06.02 SHORTNESS OF BREATH: ICD-10-CM

## 2022-03-08 DIAGNOSIS — R00.2 PALPITATIONS: ICD-10-CM

## 2022-03-08 DIAGNOSIS — G47.33 OSA ON CPAP: ICD-10-CM

## 2022-03-08 PROCEDURE — 99213 OFFICE O/P EST LOW 20 MIN: CPT | Performed by: INTERNAL MEDICINE

## 2022-03-08 PROCEDURE — 93000 ELECTROCARDIOGRAM COMPLETE: CPT | Performed by: INTERNAL MEDICINE

## 2022-03-08 RX ORDER — DIPHENOXYLATE HYDROCHLORIDE AND ATROPINE SULFATE 2.5; .025 MG/1; MG/1
TABLET ORAL DAILY
COMMUNITY
End: 2022-04-15

## 2022-03-08 RX ORDER — ASPIRIN 81 MG/1
81 TABLET ORAL
COMMUNITY
End: 2022-04-15

## 2022-03-08 NOTE — PROGRESS NOTES
Subjective   Phill Madison is a 66 y.o. male     Chief Complaint   Patient presents with   • Follow-up     Here for yearly f/u   • Hyperlipidemia   • Palpitations   • Sleep Apnea   • Syncope       PROBLEM LIST:     1. Hyperlipidemia  2. VALLE  3. Palpitations  4. Syncope and collapse, last episode approx. 2 yrs. Ago  4.1 Tlit Table, 8-1-17, pos. For neurocardiogenic syncope  5. Asthma 25 yrs. ago, reports cured with acupuncture  6. RBBB per EKG  7. CODY, uses CPAP    Specialty Problems        Cardiology Problems    Mixed hyperlipidemia        Palpitations        RBBB                HPI:    Mr. Madison returns for follow-up on the above.    He has had significantly less dizziness than he had at the time of his visit last year but has some increased sense of palpitations.  However, palpitations are never associated with chest pain, shortness of breath, lightheadedness or dizziness.  Mr. Madison queries the ability to increase his physical activity.  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  He has no difficulty with these activities and he has had no change in his functional capacity.  He denies any chest discomfort other than palpitations and he also denies orthopnea, PND, or lower extremity edema.  He does not claudicate and he describes no symptoms of TIA or stroke.    Weight is down 7 pounds from prior and Mr. Madison relates this to increase physical activity and watching his diet carefully.  Blood pressures have been well controlled.                      PRIOR MEDICATIONS    Current Outpatient Medications on File Prior to Visit   Medication Sig Dispense Refill   • aspirin (aspirin) 81 MG EC tablet Take 81 mg by mouth. Occas.     • multivitamin (MULTIVITAMIN PO) Take  by mouth Daily. Occas.     • Omega-3 Fatty Acids (FISH OIL PO) Take  by mouth. Occas.     • Sildenafil Citrate (VIAGRA PO) Take  by mouth. prn     • tadalafil (CIALIS) 20 MG tablet Take 1 tablet  "by mouth Daily As Needed for Erectile Dysfunction. prn 15 tablet 2   • [DISCONTINUED] Probiotic Product (PROBIOTIC PO) Take  by mouth 1 (One) Time Per Week.       No current facility-administered medications on file prior to visit.       ALLERGIES:    Sulfa antibiotics    PAST MEDICAL HISTORY:    Past Medical History:   Diagnosis Date   • Allergic    • Arthritis    • Asthma    • VALLE (dyspnea on exertion)    • Hyperlipidemia    • Palpitation    • Syncope and collapse        SURGICAL HISTORY:    Past Surgical History:   Procedure Laterality Date   • COLONOSCOPY     • FOOT SURGERY     • ROTATOR CUFF REPAIR      x 2       SOCIAL HISTORY:    Social History     Socioeconomic History   • Marital status:    Tobacco Use   • Smoking status: Never Smoker   • Smokeless tobacco: Never Used   Substance and Sexual Activity   • Alcohol use: No   • Drug use: No       FAMILY HISTORY:    Family History   Problem Relation Age of Onset   • Heart disease Father    • Diabetes Father    • Heart disease Mother        Review of Systems   Constitutional:        Decreased serength and stamina compared to a year ago r/t deconditioning   HENT: Negative.    Eyes: Positive for visual disturbance (glasses prn).   Respiratory: Positive for shortness of breath (mildly occas. ).    Cardiovascular: Positive for chest pain (none within last mo. but has occas. over the year) and palpitations. Negative for leg swelling.   Gastrointestinal: Negative.    Endocrine: Negative.    Genitourinary: Negative.    Musculoskeletal: Positive for arthralgias and myalgias.   Skin: Negative.    Allergic/Immunologic: Negative.    Neurological: Negative.    Hematological: Negative.    Psychiatric/Behavioral: Negative.        VISIT VITALS:  Vitals:    03/08/22 1057   BP: 117/77   BP Location: Left arm   Patient Position: Sitting   Pulse: 62   SpO2: 98%   Weight: 87.3 kg (192 lb 6.4 oz)   Height: 177.8 cm (70\")      /77 (BP Location: Left arm, Patient Position: " "Sitting)   Pulse 62   Ht 177.8 cm (70\")   Wt 87.3 kg (192 lb 6.4 oz)   SpO2 98%   BMI 27.61 kg/m²     RECENT LABS:    Objective       Physical Exam  Vitals and nursing note reviewed.   Constitutional:       General: He is not in acute distress.     Appearance: He is well-developed.   HENT:      Head: Normocephalic and atraumatic.   Eyes:      Conjunctiva/sclera: Conjunctivae normal.      Pupils: Pupils are equal, round, and reactive to light.   Neck:      Vascular: No carotid bruit, hepatojugular reflux or JVD.      Trachea: No tracheal deviation.      Comments: Nl. Carotid upstrokes  Cardiovascular:      Rate and Rhythm: Normal rate and regular rhythm.  Extrasystoles (rare) are present.     Pulses:           Radial pulses are 2+ on the right side and 2+ on the left side.      Heart sounds: S1 normal and S2 normal. No murmur heard.    No friction rub. Gallop present. S4 sounds present. No S3 sounds.   Pulmonary:      Effort: Pulmonary effort is normal.      Breath sounds: Normal breath sounds. No wheezing, rhonchi or rales.      Comments: Nl. Expir. Phase  Nl. Breath sound intensity    Abdominal:      General: Bowel sounds are normal. There is no distension or abdominal bruit.      Palpations: Abdomen is soft. There is no mass.      Tenderness: There is no abdominal tenderness. There is no guarding or rebound.      Comments: No organomegaly   Musculoskeletal:         General: No tenderness or deformity. Normal range of motion.      Cervical back: Normal range of motion and neck supple.      Right lower leg: No edema.      Left lower leg: No edema.      Comments: LLE, no edema, palpable pedal pulses  RLE, no edema, palpable pedal pulses   Skin:     General: Skin is warm and dry.      Coloration: Skin is not pale.      Findings: No erythema or rash.   Neurological:      Mental Status: He is alert and oriented to person, place, and time.   Psychiatric:         Behavior: Behavior normal.         Thought Content: " Thought content normal.         Judgment: Judgment normal.           ECG 12 Lead    Date/Time: 3/8/2022 11:28 AM  Performed by: Seth Davidson MD  Authorized by: Seth Davidson MD   Comparison: compared with previous ECG from 1/27/2021  Similar to previous ECG  Comments: Sinus at 54.  Intra-atrial conduction delay.  Right bundle branch block with normal axis.  No change from prior.              Assessment/Plan   #1.  Palpitations.  As symptoms have increased we will repeat event monitoring to ensure no malignant dysrhythmic substrate.    #2.  Dizziness.  The patient had a positive tilt table in the past and has had improvement in symptoms over time.  We will make no changes.     #3.  History of syncope.  Currently not problematic.  We will follow.     #4.  Valvular heart disease with trivial AI and mild TR by echo performed by Dr. Everett in the past.  Exam corroborates trivial to mild MR.  We will continue to monitor as the patient has no symptoms referable to valvular heart disease.     #5.  Mr. Madison will follow with Dr. Joey Story as instructed, and we will plan on seeing him in follow-up in 1 year or on a as needed basis as discussed.   Diagnosis Plan   1. Mixed hyperlipidemia     2. Dyspnea on exertion     3. Palpitations     4. RBBB     5. CODY on CPAP     6. Syncope and collapse         No follow-ups on file.         Phill Madison  reports that he has never smoked. He has never used smokeless tobacco.. I have educated him on the risk of diseases from using tobacco products such as cancer, COPD and heart disease.          Advance Care Planning   ACP discussion was held with the patient during this visit. Patient has an advance directive (not in EMR), copy requested.     Patient's Body mass index is 27.61 kg/m². indicating that he is overweight (BMI 25-29.9). Patient's (Body mass index is 27.61 kg/m².) indicates that they are overweight with health conditions that include obstructive sleep apnea,  dyslipidemias and asthma . Weight is to be assessed at today's visit. BMI is pcp addressing. We discussed portion control and increasing exercise. .             Electronically signed by:    Scribed for Seth Davidson MD by Heidy Lester LPN on March 8, 2022  at 11:04 EST    I, Seth aDvidson MD personally performed the services described in this documentation as scribed by the above named individual in my presence, and it is both accurate and complete. March 8, 2022 11:04 EST      This note is dictated utilizing voice recognition software.  Although this record has been proof read, transcriptional errors may still be present. If questions occur regarding the content of this record please do not hesitate to call our office.

## 2022-03-14 ENCOUNTER — TELEPHONE (OUTPATIENT)
Dept: CARDIOLOGY | Facility: CLINIC | Age: 67
End: 2022-03-14

## 2022-03-14 DIAGNOSIS — R00.2 PALPITATIONS: ICD-10-CM

## 2022-03-14 DIAGNOSIS — I47.1 SVT (SUPRAVENTRICULAR TACHYCARDIA): Primary | ICD-10-CM

## 2022-03-14 NOTE — TELEPHONE ENCOUNTER
"Spoke with patient regarding serious monitor event on March 13 th at 6:40 pm. Patient states he does not remember any symptoms at that time, reports he was at [employer]. Patient states he has been noticing he is \"aware of heart beating\". Patient instructed to go directly to ER with any chest pain, shortness of breath. Per Rosalio BROWN patient to have echo, refer to EP in [place]. Appointment with Dr Davidson moved up to May 4 th. Patient verbalized understanding. Orders for EP and Echo placed. Melba Carpenter LPN    "

## 2022-03-16 ENCOUNTER — TELEPHONE (OUTPATIENT)
Dept: CARDIOLOGY | Facility: CLINIC | Age: 67
End: 2022-03-16

## 2022-03-16 DIAGNOSIS — I47.1 SVT (SUPRAVENTRICULAR TACHYCARDIA): Primary | ICD-10-CM

## 2022-03-17 ENCOUNTER — HOSPITAL ENCOUNTER (OUTPATIENT)
Dept: CARDIOLOGY | Facility: HOSPITAL | Age: 67
Discharge: HOME | End: 2022-03-17
Admitting: PHYSICIAN ASSISTANT

## 2022-03-17 DIAGNOSIS — R00.2 PALPITATIONS: ICD-10-CM

## 2022-03-17 PROCEDURE — 93306 TTE W/DOPPLER COMPLETE: CPT

## 2022-03-17 PROCEDURE — 93306 TTE W/DOPPLER COMPLETE: CPT | Performed by: INTERNAL MEDICINE

## 2022-03-17 RX ORDER — METOPROLOL SUCCINATE 25 MG/1
25 TABLET, EXTENDED RELEASE ORAL DAILY
Qty: 30 TABLET | Refills: 11 | Status: SHIPPED | OUTPATIENT
Start: 2022-03-17 | End: 2022-03-23 | Stop reason: ALTCHOICE

## 2022-03-23 ENCOUNTER — OFFICE VISIT (OUTPATIENT)
Dept: CARDIOLOGY | Facility: CLINIC | Age: 67
End: 2022-03-23

## 2022-03-23 VITALS
WEIGHT: 196 LBS | BODY MASS INDEX: 28.06 KG/M2 | SYSTOLIC BLOOD PRESSURE: 110 MMHG | OXYGEN SATURATION: 98 % | HEIGHT: 70 IN | HEART RATE: 50 BPM | DIASTOLIC BLOOD PRESSURE: 66 MMHG

## 2022-03-23 DIAGNOSIS — I47.1 SVT (SUPRAVENTRICULAR TACHYCARDIA): ICD-10-CM

## 2022-03-23 DIAGNOSIS — Z99.89 OSA ON CPAP: ICD-10-CM

## 2022-03-23 DIAGNOSIS — G47.33 OSA ON CPAP: ICD-10-CM

## 2022-03-23 DIAGNOSIS — R06.09 DYSPNEA ON EXERTION: ICD-10-CM

## 2022-03-23 DIAGNOSIS — R55 SYNCOPE AND COLLAPSE: ICD-10-CM

## 2022-03-23 DIAGNOSIS — R00.2 PALPITATIONS: ICD-10-CM

## 2022-03-23 DIAGNOSIS — I45.10 RBBB: ICD-10-CM

## 2022-03-23 DIAGNOSIS — E78.2 MIXED HYPERLIPIDEMIA: Primary | ICD-10-CM

## 2022-03-23 LAB
BH CV ECHO MEAS - ACS: 2.5 CM
BH CV ECHO MEAS - AO MAX PG: 5 MMHG
BH CV ECHO MEAS - AO MEAN PG: 3 MMHG
BH CV ECHO MEAS - AO ROOT AREA (BSA CORRECTED): 1.6
BH CV ECHO MEAS - AO ROOT AREA: 8.8 CM^2
BH CV ECHO MEAS - AO ROOT DIAM: 3.4 CM
BH CV ECHO MEAS - AO V2 MAX: 112 CM/SEC
BH CV ECHO MEAS - AO V2 MEAN: 79 CM/SEC
BH CV ECHO MEAS - AO V2 VTI: 23.6 CM
BH CV ECHO MEAS - BSA(HAYCOCK): 2.1 M^2
BH CV ECHO MEAS - BSA: 2.1 M^2
BH CV ECHO MEAS - BZI_BMI: 27.5 KILOGRAMS/M^2
BH CV ECHO MEAS - BZI_METRIC_HEIGHT: 177.8 CM
BH CV ECHO MEAS - BZI_METRIC_WEIGHT: 87.1 KG
BH CV ECHO MEAS - EDV(CUBED): 89.9 ML
BH CV ECHO MEAS - EDV(MOD-SP4): 74.8 ML
BH CV ECHO MEAS - EDV(TEICH): 91.5 ML
BH CV ECHO MEAS - EF(CUBED): 67.2 %
BH CV ECHO MEAS - EF(MOD-SP4): 43.7 %
BH CV ECHO MEAS - EF(TEICH): 58.9 %
BH CV ECHO MEAS - EF_3D-VOL: 21 %
BH CV ECHO MEAS - ESV(CUBED): 29.5 ML
BH CV ECHO MEAS - ESV(MOD-SP4): 42.1 ML
BH CV ECHO MEAS - ESV(TEICH): 37.6 ML
BH CV ECHO MEAS - FS: 31 %
BH CV ECHO MEAS - IVS/LVPW: 1.13 CM
BH CV ECHO MEAS - IVSD: 1.44 CM
BH CV ECHO MEAS - LA DIMENSION: 4 CM
BH CV ECHO MEAS - LA/AO: 1.2
BH CV ECHO MEAS - LAT PEAK E' VEL: 5.4 CM/SEC
BH CV ECHO MEAS - LV DIASTOLIC VOL/BSA (35-75): 36.5 CM2
BH CV ECHO MEAS - LV IVRT: 0.14 SEC
BH CV ECHO MEAS - LV MASS(C)D: 236.3 GRAMS
BH CV ECHO MEAS - LV MASS(C)DI: 115.2 GRAMS/M^2
BH CV ECHO MEAS - LV SYSTOLIC VOL/BSA (12-30): 20.5 CM2
BH CV ECHO MEAS - LVIDD: 4.5 CM
BH CV ECHO MEAS - LVIDS: 3.1 CM
BH CV ECHO MEAS - LVLD AP4: 6.9 CM
BH CV ECHO MEAS - LVLS AP4: 6.8 CM
BH CV ECHO MEAS - LVOT AREA (M): 3.1 CM^2
BH CV ECHO MEAS - LVOT AREA: 3.1 CM2
BH CV ECHO MEAS - LVOT DIAM: 2 CM
BH CV ECHO MEAS - LVPWD: 1.28 CM
BH CV ECHO MEAS - MED PEAK E' VEL: 4 CM/SEC
BH CV ECHO MEAS - MV A MAX VEL: 52.3 CM/SEC
BH CV ECHO MEAS - MV DEC SLOPE: 146 CM/SEC2
BH CV ECHO MEAS - MV E MAX VEL: 45 CM/SEC
BH CV ECHO MEAS - MV E/A: 0.86
BH CV ECHO MEAS - RAP SYSTOLE: 10 MMHG
BH CV ECHO MEAS - RVDD: 3.5 CM
BH CV ECHO MEAS - RVSP: 19.9 MMHG
BH CV ECHO MEAS - SI(AO): 101.4 ML/M^2
BH CV ECHO MEAS - SI(CUBED): 29.4 ML/M^2
BH CV ECHO MEAS - SI(MOD-SP4): 15.9 ML/M2
BH CV ECHO MEAS - SI(TEICH): 26.3 ML/M^2
BH CV ECHO MEAS - SV(AO): 208 ML
BH CV ECHO MEAS - SV(CUBED): 60.4 ML
BH CV ECHO MEAS - SV(MOD-SP4): 32.7 ML
BH CV ECHO MEAS - SV(TEICH): 53.9 ML
BH CV ECHO MEAS - TR MAX PG: 9.9 MMHG
BH CV ECHO MEAS - TR MAX VEL: 157 CM/SEC
LEFT ATRIUM VOLUME INDEX: 32.6 ML/M2
MAXIMAL PREDICTED HEART RATE: 153 BPM
STRESS TARGET HR: 130 BPM

## 2022-03-23 PROCEDURE — 99213 OFFICE O/P EST LOW 20 MIN: CPT | Performed by: INTERNAL MEDICINE

## 2022-03-23 RX ORDER — FLECAINIDE ACETATE 50 MG/1
50 TABLET ORAL 2 TIMES DAILY
Qty: 60 TABLET | Refills: 11 | Status: SHIPPED | OUTPATIENT
Start: 2022-03-23 | End: 2022-10-07 | Stop reason: SDUPTHER

## 2022-03-23 NOTE — PROGRESS NOTES
"Subjective   Phill Madison is a 66 y.o. male     Chief Complaint   Patient presents with   • Follow-up     Here for event monitor, SVT   • Rapid Heart Rate     SVT per monitor   • Palpitations       PROBLEM LIST:   1. Hyperlipidemia  2. VALLE  3. Palpitations  4. Syncope and collapse, last episode approx. 2 yrs. Ago  4.1 Tlit Table, 8-1-17, pos. For neurocardiogenic syncope  5. Asthma 25 yrs. ago, reports cured with acupuncture  6. RBBB per EKG  7. CODY, uses CPAP    Specialty Problems        Cardiology Problems    Mixed hyperlipidemia        Palpitations        RBBB                HPI:    Mr. Madison returns for follow-up on testing.    Event monitoring demonstrated multiple episodes of SVT with some correlation with palpitations.  He has had no further presyncope or syncope.  Mr. Madison describes that he was refused life insurance because of \"heart disease\".  Review of prior records demonstrated no evidence of ischemia on stress testing, low normal LV systolic function on stress testing, preserved EF on echo with mild MR and TR by prior evaluation.    Echocardiogram performed 2 days ago demonstrated preserved LV systolic function with an ejection fraction of 55- 60%.  There is mild concentric left ventricular hypertrophy with grade 1 diastolic dysfunction.  There is trivial to mild MR, trivial TR and AI.  There is no hemodynamically significant valve, pericardial, or great vessel pathology, and pulmonary pressures were normal.                  PRIOR MEDICATIONS    Current Outpatient Medications on File Prior to Visit   Medication Sig Dispense Refill   • aspirin 81 MG EC tablet Take 81 mg by mouth. Occas.     • metoprolol succinate XL (TOPROL-XL) 25 MG 24 hr tablet Take 1 tablet by mouth Daily. 30 tablet 11   • multivitamin (THERAGRAN) tablet tablet Take  by mouth Daily. Occas.     • Omega-3 Fatty Acids (FISH OIL PO) Take  by mouth. Occas.     • Sildenafil Citrate (VIAGRA PO) Take  by mouth. prn     • tadalafil (CIALIS) 20 " "MG tablet Take 1 tablet by mouth Daily As Needed for Erectile Dysfunction. prn 15 tablet 2     No current facility-administered medications on file prior to visit.       ALLERGIES:    Sulfa antibiotics    PAST MEDICAL HISTORY:    Past Medical History:   Diagnosis Date   • Allergic    • Arthritis    • Asthma    • VALLE (dyspnea on exertion)    • Hyperlipidemia    • Palpitation    • Syncope and collapse        SURGICAL HISTORY:    Past Surgical History:   Procedure Laterality Date   • COLONOSCOPY     • FOOT SURGERY     • ROTATOR CUFF REPAIR      x 2       SOCIAL HISTORY:    Social History     Socioeconomic History   • Marital status:    Tobacco Use   • Smoking status: Never Smoker   • Smokeless tobacco: Never Used   Substance and Sexual Activity   • Alcohol use: No   • Drug use: No       FAMILY HISTORY:    Family History   Problem Relation Age of Onset   • Heart disease Father    • Diabetes Father    • Heart disease Mother        Review of Systems   Constitutional: Positive for fatigue.   HENT: Negative.    Eyes: Positive for visual disturbance (glasses prn).   Respiratory: Shortness of breath: occas.     Cardiovascular: Positive for palpitations. Negative for chest pain and leg swelling.   Gastrointestinal: Negative.    Endocrine: Negative.    Genitourinary: Negative.    Musculoskeletal: Positive for arthralgias and myalgias.   Skin: Negative.    Allergic/Immunologic: Negative.    Neurological: Positive for dizziness and light-headedness. Negative for syncope.   Hematological: Negative.    Psychiatric/Behavioral: Negative.        VISIT VITALS:  Vitals:    03/23/22 0855   BP: 110/66   BP Location: Left arm   Patient Position: Sitting   Pulse: 50   SpO2: 98%   Weight: 88.9 kg (196 lb)   Height: 177.8 cm (70\")      /66 (BP Location: Left arm, Patient Position: Sitting)   Pulse 50   Ht 177.8 cm (70\")   Wt 88.9 kg (196 lb)   SpO2 98%   BMI 28.12 kg/m²     RECENT LABS:    Objective       Physical " Exam    Procedures      Assessment/Plan   #1.  SVT.  Mr. 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  Therefore, despite the absence of consistent correlation with palpitations, I feel that SVT is a likely precipitating factor.  After much discussion we elected to start flecainide 50 mg twice a day and follow QTC closely.    2.  Mr. Madison functions a class I levels of activity, has normal LV systolic function, minimal diastolic dysfunction, and no significant valve, pericardial, or great vessel pathology.  I see no reason why he would have any life expectancy limitation from a cardiac status.  We will try to represent that to his insurance company if able.    3.  History of syncope with positive tilt table test.  The patient has been asymptomatic of syncope for a prolonged period we will continue to monitor.    4.  We will follow up on EKG in 72 hours, and Mr. Madison will follow with Dr. Story as instructed.  Otherwise, we will plan on seeing him in a yearly basis or on a as needed basis for symptoms.   Diagnosis Plan   1. Mixed hyperlipidemia     2. Dyspnea on exertion     3. Palpitations     4. RBBB     5. CODY on CPAP     6. Syncope and collapse         No follow-ups on file.         Phill Madison  reports that he has never smoked. He has never used smokeless tobacco.. I have educated him on the risk of diseases from using tobacco products such as cancer, COPD and heart disease.             Patient's Body mass index is 28.12 kg/m². indicating that he is overweight (BMI 25-29.9). Patient's (Body mass index is 28.12 kg/m².) indicates that they are overweight with health conditions that include obstructive sleep apnea, dyslipidemias and SVT . Weight is to be assessed at today's visit. BMI is pcp addressing. We discussed portion control and increasing exercise. .             Electronically signed by:    Angelitaibed for  Seth Davidson MD by Heidy Lester LPN on March 23, 2022  at 08:58 EDT    I, Seth Davidson MD personally performed the services described in this documentation as scribed by the above named individual in my presence, and it is both accurate and complete. March 23, 2022 08:58 EDT      This note is dictated utilizing voice recognition software.  Although this record has been proof read, transcriptional errors may still be present. If questions occur regarding the content of this record please do not hesitate to call our office.

## 2022-03-24 ENCOUNTER — TELEPHONE (OUTPATIENT)
Dept: CARDIOLOGY | Facility: CLINIC | Age: 67
End: 2022-03-24

## 2022-03-24 NOTE — TELEPHONE ENCOUNTER
ECHO  Pt notified of no acute findings. Provider will discuss results at f/u. Pt reminded of appt date and time.    ----- Message from KEVIN Benson sent at 3/24/2022 11:40 AM EDT -----  Routine follow-up

## 2022-03-28 ENCOUNTER — CLINICAL SUPPORT (OUTPATIENT)
Dept: CARDIOLOGY | Facility: CLINIC | Age: 67
End: 2022-03-28

## 2022-03-28 VITALS
DIASTOLIC BLOOD PRESSURE: 75 MMHG | SYSTOLIC BLOOD PRESSURE: 127 MMHG | HEIGHT: 70 IN | OXYGEN SATURATION: 97 % | BODY MASS INDEX: 28.46 KG/M2 | WEIGHT: 198.8 LBS | HEART RATE: 57 BPM

## 2022-03-28 DIAGNOSIS — I47.1 SVT (SUPRAVENTRICULAR TACHYCARDIA): Primary | ICD-10-CM

## 2022-03-28 PROCEDURE — 99211 OFF/OP EST MAY X REQ PHY/QHP: CPT | Performed by: INTERNAL MEDICINE

## 2022-03-28 NOTE — PROGRESS NOTES
Phill HEYDI Madison  1955  3/28/2022   ?   No chief complaint on file.     ?   HPI:   ?  Patient here for nurse visit EKG. Started Flec. 50 mg po bid on Friday due to HX SVT per event monitor. Patient states had sone nausea day one but does not know if Flec. related or something else, but sx has resolved. He has continue to eliminate caffeine from his diet except for his tea. Also received Day 15 serious event monitor from 3- at 9:00 am CT indicating episode of VT. EKG performed and to be reviewed by Dr. Davidson. PH,LPN.   ?   ?     Current Outpatient Medications:   •  aspirin 81 MG EC tablet, Take 81 mg by mouth. Occas., Disp: , Rfl:   •  flecainide (TAMBOCOR) 50 MG tablet, Take 1 tablet by mouth 2 (Two) Times a Day., Disp: 60 tablet, Rfl: 11  •  multivitamin (THERAGRAN) tablet tablet, Take  by mouth Daily. Occas., Disp: , Rfl:   •  Omega-3 Fatty Acids (FISH OIL PO), Take  by mouth. Occas., Disp: , Rfl:   •  Sildenafil Citrate (VIAGRA PO), Take  by mouth. prn, Disp: , Rfl:   •  tadalafil (CIALIS) 20 MG tablet, Take 1 tablet by mouth Daily As Needed for Erectile Dysfunction. prn, Disp: 15 tablet, Rfl: 2   ?   ?   Sulfa antibiotics       Procedures     ?   Assessment/Plan    ?   ?1. SVT    EKG reviewed by Dr. Davidson along with Day 15 monitor event. Questions if serious report is true VT. Requested event to be reviewed by EP, Dr. Blank's office for confirmation. Message sent to TERI Boucher at their office requesting this. V/O per Dr. Davidson to d/c Flec. And pending EP opinion may proceed with another med. Option. Patient aware to stop Flec. And our office would be in contact once EP responds. Verbalized he understood. PH,L,PN        ?

## 2022-03-30 ENCOUNTER — TELEPHONE (OUTPATIENT)
Dept: CARDIOLOGY | Facility: CLINIC | Age: 67
End: 2022-03-30

## 2022-03-30 NOTE — TELEPHONE ENCOUNTER
Sejal from Dr. Davidson's office called regarding patient's cardiac monitor report form 3/25/22. It show's 30 seconds of Vtach and they would like to know if it truly is. Report placed on desk.    Call back number: [phone] option 2 attn: Sejal.

## 2022-03-31 ENCOUNTER — TELEPHONE (OUTPATIENT)
Dept: CARDIOLOGY | Facility: CLINIC | Age: 67
End: 2022-03-31

## 2022-03-31 NOTE — TELEPHONE ENCOUNTER
Zenia Samaniego, TERI     AM    3:35 PM  Note    Sejal from Dr. Davidson's office called regarding patient's cardiac monitor report form 3/25/22. It show's 30 seconds of Vtach and they would like to know if it truly is. Report placed on desk.     Call back number: [phone] option 2 attn: Sejal.                AM    3:35 PM  Zenia Samaniego, TERI routed this conversation to Tj Montano MD     March 31, 2022    Coleen Olivera RN         11:12 AM  Note    Dr. Blank reviewed the monitor strips. His intrepretation is Afib with NONI Post notified and aware.        ABOVE DISCUSSED WITH DR. DAVIDSON. V/O PER DR. DAVIDSON SINCE VT CONFIRMED PER EP, RESTART FLECAINIDE 50 MG PO BID JOCELYN, AND COME TO THE OFFICE ON Monday FOR A NURSE VISIT EKG. L/M AT CELL NUMBER IN CHART TO CALL THE OFFICE BACK TO DISCUSS. PH,LPN    PATIENT RETURNED CALL, EXPLAINED ABOVE TO HIM AND HE IS AWARE TO RESTART FLEC. AT 50 MG PO BID IN AM AND HE WILL BE HERE ON Monday AT 10:00 AM FOR EKG. PH,LPN

## 2022-03-31 NOTE — TELEPHONE ENCOUNTER
Dr. Blank reviewed the monitor strips. His intrepretation is Afib with NONI Post notified and aware.

## 2022-04-04 ENCOUNTER — CLINICAL SUPPORT (OUTPATIENT)
Dept: CARDIOLOGY | Facility: CLINIC | Age: 67
End: 2022-04-04

## 2022-04-04 VITALS
DIASTOLIC BLOOD PRESSURE: 71 MMHG | SYSTOLIC BLOOD PRESSURE: 124 MMHG | BODY MASS INDEX: 28.75 KG/M2 | OXYGEN SATURATION: 95 % | HEART RATE: 51 BPM | HEIGHT: 70 IN | WEIGHT: 200.8 LBS

## 2022-04-04 PROCEDURE — 93000 ELECTROCARDIOGRAM COMPLETE: CPT | Performed by: PHYSICIAN ASSISTANT

## 2022-04-04 NOTE — PROGRESS NOTES
Phill Madison  1955 4/4/2022   ?   Chief Complaint   Patient presents with   • Nurse Visit     EKG      ?   HPI: Note  from Nancy LIU 03/31/22  Dr. Blank reviewed the monitor strips. His intrepretation is Afib with RVR. Sejal notified and aware.         ABOVE DISCUSSED WITH DR. LAU. V/O PER DR. LAU SINCE VT CONFIRMED PER EP, RESTART FLECAINIDE 50 MG PO BID JOCELYN, AND COME TO THE OFFICE ON Monday FOR A NURSE VISIT EKG.    Patients states he feels very light headed off/on x 6 months, states it has become worst last x 1 month, states happens at random times, denies any chest pain, states he does get SOB but it is not a new occurrence at this time, has been going on x 6 months.      ?   ?   ?     Current Outpatient Medications:   •  aspirin 81 MG EC tablet, Take 81 mg by mouth. Occas., Disp: , Rfl:   •  flecainide (TAMBOCOR) 50 MG tablet, Take 1 tablet by mouth 2 (Two) Times a Day., Disp: 60 tablet, Rfl: 11  •  multivitamin (THERAGRAN) tablet tablet, Take  by mouth Daily. Occas., Disp: , Rfl:   •  Omega-3 Fatty Acids (FISH OIL PO), Take  by mouth. Occas., Disp: , Rfl:   •  Sildenafil Citrate (VIAGRA PO), Take  by mouth. prn, Disp: , Rfl:   •  tadalafil (CIALIS) 20 MG tablet, Take 1 tablet by mouth Daily As Needed for Erectile Dysfunction. prn, Disp: 15 tablet, Rfl: 2   ?   ?   Sulfa antibiotics       Procedures EKG Performed     ?   Assessment/Plan   1)AFIB  Per Celso Wiley,PAC  EKG Demonstrates Stable QT/C intervals.  Monitor HR/ Reevaluate tomorrow.   ?   ?   ?

## 2022-04-05 ENCOUNTER — CLINICAL SUPPORT (OUTPATIENT)
Dept: CARDIOLOGY | Facility: CLINIC | Age: 67
End: 2022-04-05

## 2022-04-05 VITALS
BODY MASS INDEX: 28.8 KG/M2 | HEART RATE: 54 BPM | DIASTOLIC BLOOD PRESSURE: 75 MMHG | WEIGHT: 201.2 LBS | OXYGEN SATURATION: 99 % | SYSTOLIC BLOOD PRESSURE: 122 MMHG | HEIGHT: 70 IN

## 2022-04-05 DIAGNOSIS — R55 SYNCOPE AND COLLAPSE: Primary | ICD-10-CM

## 2022-04-05 DIAGNOSIS — I47.1 SVT (SUPRAVENTRICULAR TACHYCARDIA): ICD-10-CM

## 2022-04-05 PROCEDURE — 93000 ELECTROCARDIOGRAM COMPLETE: CPT | Performed by: PHYSICIAN ASSISTANT

## 2022-04-05 NOTE — PROGRESS NOTES
Phill SOLIZ Los  1955 4/5/2022   ?   No chief complaint on file.     ?   HPI:   ?Patient presents today for follow up EKG for SVT. He has started flecainide 50 mg.He states that he felt really good this morning .    ?   ?     Current Outpatient Medications:   •  aspirin 81 MG EC tablet, Take 81 mg by mouth. Occas., Disp: , Rfl:   •  flecainide (TAMBOCOR) 50 MG tablet, Take 1 tablet by mouth 2 (Two) Times a Day., Disp: 60 tablet, Rfl: 11  •  multivitamin (THERAGRAN) tablet tablet, Take  by mouth Daily. Occas., Disp: , Rfl:   •  Omega-3 Fatty Acids (FISH OIL PO), Take  by mouth. Occas., Disp: , Rfl:   •  Sildenafil Citrate (VIAGRA PO), Take  by mouth. prn, Disp: , Rfl:   •  tadalafil (CIALIS) 20 MG tablet, Take 1 tablet by mouth Daily As Needed for Erectile Dysfunction. prn, Disp: 15 tablet, Rfl: 2   ?   ?   Sulfa antibiotics       Procedures     ?   Assessment/Plan    ? 1. SVT   Chart reviewed by Rosalio BROWN . Patient will keep regular scheduled appointments. He will continue the same with medication. He will call with worsening concerns or questions.Patient verbalized understanding of above. Brittaney FLORIAN      ?   ?

## 2022-04-13 NOTE — PROGRESS NOTES
Cardiac Electrophysiology Outpatient Note  [facility]    Office Visit     Pihll Madison  [id number]  04/15/2022    Primary Care Physician: Joey Story MD    Referred By: Rosalio Marshall PA    Subjective     Chief Complaint   Patient presents with   • Abnormal Monitor   • Rapid Heart Rate       History of Present Illness:   Phill Madison is a 66 y.o. male who presents to my electrophysiology clinic for evaluation of palpitations and SVT/atrial fibrillation.  He is followed with Dr. Davidson is referred for further management.  He reports having 2 episodes of syncope over the past year.  These were somewhat remote and actually both occurred with driving.  These were not associated with any palpitations at the time.  On further discussion it sounds like he probably did not fully pass out to his had pretty profound presyncope.  He has at times felt some palpitation as well but he will describe this as a fluttering sensation in his heart.  He is unable to tell how often this is happening, he does not think it is very common.  Otherwise he denies chest pain, dyspnea, dyspnea on exertion, orthopnea, PND, or lower extremity swelling.  He was initially started on metoprolol after an event monitor showed some possible episodes of SVT.  This showed some other episodes consistent with atrial fibrillation he was started on flecainide.      Past Medical History:   Diagnosis Date   • Allergic    • Arthritis    • Asthma    • VALLE (dyspnea on exertion)    • Hyperlipidemia    • Palpitation    • Syncope and collapse        Past Surgical History:   Procedure Laterality Date   • COLONOSCOPY     • FOOT SURGERY     • ROTATOR CUFF REPAIR      x 2       Family History   Problem Relation Age of Onset   • Heart disease Father    • Diabetes Father    • Heart disease Mother        Social History     Socioeconomic History   • Marital status:    Tobacco Use   • Smoking status: Never Smoker  "  • Smokeless tobacco: Never Used   Vaping Use   • Vaping Use: Never used   Substance and Sexual Activity   • Alcohol use: No   • Drug use: No   • Sexual activity: Defer         Current Outpatient Medications:   •  flecainide (TAMBOCOR) 50 MG tablet, Take 1 tablet by mouth 2 (Two) Times a Day., Disp: 60 tablet, Rfl: 11  •  Omega-3 Fatty Acids (FISH OIL PO), Take  by mouth. Occas., Disp: , Rfl:     Allergies:   Allergies   Allergen Reactions   • Sulfa Antibiotics Hives       Objective   Vital Signs: Blood pressure 103/80, pulse 54, height 180.3 cm (71\"), weight 93 kg (205 lb), SpO2 96 %.    PHYSICAL EXAM  General appearance: Awake, alert, cooperative  Head: Normocephalic, without obvious abnormality, atraumatic  Neck: No JVD  Lungs: Clear to ascultation bilaterally  Heart: Regular rate and rhythm, no murmurs, 2+ LE pulses, no lower extremity swelling  Skin: Skin color, turgor normal, no rashes or lesions  Neurologic: Grossly normal     No results found for: GLUCOSE, CALCIUM, NA, K, CO2, CL, BUN, CREATININE, EGFRIFAFRI, EGFRIFNONA, BCR, ANIONGAP  No results found for: WBC, HGB, HCT, MCV, PLT  No results found for: INR, PROTIME  No results found for: TSH, D9NOYMJ, L6KVVWQ, THYROIDAB       Results for orders placed during the hospital encounter of 03/17/22    Adult Transthoracic Echo Complete W/ Cont if Necessary Per Protocol    Interpretation Summary  Technically excellent study.    1.  LV size, function, and wall motion are normal.  Mild concentric left ventricular hypertrophy.  Visually estimated ejection fraction is 55 to 60%.  Grade 1 diastolic dysfunction.  Borderline to minimal biatrial enlargement.  RV size and function are preserved.  No septal defect or intracavitary mass or thrombus.    2.  Valves are morphologically and physiologically normal with no stenotic lesions, valve associated masses or thrombi, or hemodynamically significant regurgitation.  There is trivial to mild MR, trivial AI, and trivial " TR.    3.  No pericardial or great vessel pathology.    4.  Normal pulmonary pressures.         I personally viewed and interpreted the patient's EKG/Telemetry/lab data      ECG 12 Lead    Date/Time: 4/15/2022 12:51 PM  Performed by: Tj Montano MD  Authorized by: Tj Montano MD   Comparison: compared with previous ECG from 4/5/2022  Similar to previous ECG  Comments: Sinus bradycardia, right bundle branch block            Phill Madison  reports that he has never smoked. He has never used smokeless tobacco.    Assessment & Plan    1. Paroxysmal atrial fibrillation (HCC)  I first reviewed the results of his ambulatory monitor that was recently done.  There were several episodes on the edge appear consistent with atrial fibrillation. Unfortunately some of the tracings have significant artifact which does make interpretation somewhat difficult.  There are also other times where he has a more regular rhythm may represent atrial flutter or a supraventricular tachycardia.  Is difficult to make this distinction based on these monitor tracings.  He was started on flecainide towards the very end of this monitoring.  He is unable to tell if it is made significant difference he usually does not have any palpitations associated with these episodes.  He also is concerned that sleep apnea has been associated with these events.  He had a lot of problems with his CPAP and is not had consistent use of it recently.  He just recently had fixed and is now using it better.    Discussion about atrial fibrillation, including the pathophysiology and potential treatment options.  He just recently started flecainide 50 mg twice daily.  He is unsure if it is making a difference because he cannot feel symptoms right now.  We discussed different options we elected to start with another 30-day monitor to see how many episodes he is having currently and if the flecainide is helping.  His [id number] score is 1 (age only) we had a discussion  about anticoagulation versus aspirin.  The current time he is can start aspirin 81 mg.  We will discuss this further in the future after the pending monitor results.  If he is having more atrial fibrillation we discussed about increasing the dose of flecainide versus considering catheter ablation we will address this at our next visit.    2. SVT (supraventricular tachycardia) (HCC)  As above he had possible episode of supraventricular tachycardia on his monitor.  It is difficult to tell whether this represented a true SVT versus atrial flutter.    3. CODY on CPAP  Has a history of CODY and as above has had some trouble with his CPAP machine.  He just recently had it fixed and is interested in seeing if his symptoms are better on the recent monitor coming up.      Follow Up:  Return in about 3 months (around 7/15/2022) for Recheck.    I spent 46 minutes caring for Phill on this date of service. This time includes time spent by me in the following activities:preparing for the visit, reviewing tests, obtaining and/or reviewing a separately obtained history, performing a medically appropriate examination and/or evaluation , counseling and educating the patient/family/caregiver, ordering medications, tests, or procedures, referring and communicating with other health care professionals , documenting information in the medical record, independently interpreting results and communicating that information with the patient/family/caregiver, and care coordination.    Thank you for allowing me to participate in the care of your patient. Please do not hesitate to contact me with additional questions or concerns.      Tj Montano M.D.  Cardiac Electrophysiologist  [facility] / [facility]

## 2022-04-15 ENCOUNTER — OFFICE VISIT (OUTPATIENT)
Dept: CARDIOLOGY | Facility: CLINIC | Age: 67
End: 2022-04-15

## 2022-04-15 VITALS
BODY MASS INDEX: 28.7 KG/M2 | SYSTOLIC BLOOD PRESSURE: 103 MMHG | HEIGHT: 71 IN | DIASTOLIC BLOOD PRESSURE: 80 MMHG | WEIGHT: 205 LBS | OXYGEN SATURATION: 96 % | HEART RATE: 54 BPM

## 2022-04-15 DIAGNOSIS — I48.0 PAROXYSMAL ATRIAL FIBRILLATION: Primary | ICD-10-CM

## 2022-04-15 DIAGNOSIS — Z99.89 OSA ON CPAP: ICD-10-CM

## 2022-04-15 DIAGNOSIS — I47.1 SVT (SUPRAVENTRICULAR TACHYCARDIA): ICD-10-CM

## 2022-04-15 DIAGNOSIS — G47.33 OSA ON CPAP: ICD-10-CM

## 2022-04-15 PROCEDURE — 99204 OFFICE O/P NEW MOD 45 MIN: CPT | Performed by: STUDENT IN AN ORGANIZED HEALTH CARE EDUCATION/TRAINING PROGRAM

## 2022-04-15 PROCEDURE — 93000 ELECTROCARDIOGRAM COMPLETE: CPT | Performed by: STUDENT IN AN ORGANIZED HEALTH CARE EDUCATION/TRAINING PROGRAM

## 2022-04-18 DIAGNOSIS — R00.2 PALPITATIONS: Primary | ICD-10-CM

## 2022-05-04 ENCOUNTER — OFFICE VISIT (OUTPATIENT)
Dept: CARDIOLOGY | Facility: CLINIC | Age: 67
End: 2022-05-04

## 2022-05-04 VITALS
HEART RATE: 63 BPM | SYSTOLIC BLOOD PRESSURE: 115 MMHG | DIASTOLIC BLOOD PRESSURE: 75 MMHG | HEIGHT: 71 IN | WEIGHT: 201.8 LBS | OXYGEN SATURATION: 97 % | BODY MASS INDEX: 28.25 KG/M2

## 2022-05-04 DIAGNOSIS — R07.2 PRECORDIAL PAIN: ICD-10-CM

## 2022-05-04 DIAGNOSIS — R06.09 DYSPNEA ON EXERTION: Primary | ICD-10-CM

## 2022-05-04 DIAGNOSIS — Z99.89 OSA ON CPAP: ICD-10-CM

## 2022-05-04 DIAGNOSIS — R55 SYNCOPE AND COLLAPSE: ICD-10-CM

## 2022-05-04 DIAGNOSIS — I45.10 RBBB: ICD-10-CM

## 2022-05-04 DIAGNOSIS — R00.2 PALPITATIONS: ICD-10-CM

## 2022-05-04 DIAGNOSIS — E78.2 MIXED HYPERLIPIDEMIA: ICD-10-CM

## 2022-05-04 DIAGNOSIS — G47.33 OSA ON CPAP: ICD-10-CM

## 2022-05-04 PROCEDURE — 99214 OFFICE O/P EST MOD 30 MIN: CPT | Performed by: INTERNAL MEDICINE

## 2022-05-04 RX ORDER — ASPIRIN 81 MG/1
81 TABLET ORAL DAILY
COMMUNITY

## 2022-05-04 NOTE — PROGRESS NOTES
Subjective   Phill Madison is a 66 y.o. male     Chief Complaint   Patient presents with   • Follow-up     Here for testing f/u   • Hyperlipidemia   • Palpitations   • Sleep Apnea   • Syncope       PROBLEM LIST:     1. Hyperlipidemia  2. VALLE  3. Palpitations  3.1 Event monitor, 3- - 4-9-2022, sinus with sustained runs SVT freq. > 1 min with rate to 165, occas. PVC'S, occas. NSVT. TX'D with Flec. Referred to EP  4. Syncope and collapse, last episode approx. 2 yrs. Ago  4.1 Tlit Table, 8-1-17, pos. For neurocardiogenic syncope  5. Asthma 25 yrs. ago, reports cured with acupuncture  6. RBBB per EKG  7. CODY, uses CPAP      Specialty Problems        Cardiology Problems    Mixed hyperlipidemia        Palpitations        RBBB                HPI:    Mr. Madison returns for follow-up on the above.  He describes an episode of chest discomfort.  He had an electric shocklike sensation in the left parasternal and mid left precordial region.  There was no associated nausea, diaphoresis, shortness of breath, symptoms were not pleuritic or positional, discomfort was not described as severe or oppressive, and symptoms resolved spontaneously in 1 to 2 minutes.  Symptoms occurred at rest.  However, Mr. Madison describes pounding fence posts, hauling grain and feet, and doing other types of heavy physical activity without any difficulty whatsoever.  He states that last month he was more active than he has been for a prolonged period and felt better than he has in a long time.    Mr. Madison has had no palpitations.  He has had none of these severe presyncope that he described previously.  He does have occasional episodes where he feels like his vision is beginning to tunnel but these episodes are never associated with positional change or palpitations.  He had an episode while being examined today and blood pressure was normal as was heart rate.                  PRIOR MEDICATIONS    Current Outpatient Medications on File Prior to Visit  "  Medication Sig Dispense Refill   • aspirin (aspirin) 81 MG EC tablet Take 81 mg by mouth Daily.     • flecainide (TAMBOCOR) 50 MG tablet Take 1 tablet by mouth 2 (Two) Times a Day. 60 tablet 11   • Omega-3 Fatty Acids (FISH OIL PO) Take  by mouth. Occas.       No current facility-administered medications on file prior to visit.       ALLERGIES:    Sulfa antibiotics    PAST MEDICAL HISTORY:    Past Medical History:   Diagnosis Date   • Allergic    • Arthritis    • Asthma    • VALLE (dyspnea on exertion)    • Hyperlipidemia    • Palpitation    • Syncope and collapse        SURGICAL HISTORY:    Past Surgical History:   Procedure Laterality Date   • COLONOSCOPY     • FOOT SURGERY     • ROTATOR CUFF REPAIR      x 2       SOCIAL HISTORY:    Social History     Socioeconomic History   • Marital status:    Tobacco Use   • Smoking status: Never Smoker   • Smokeless tobacco: Never Used   Vaping Use   • Vaping Use: Never used   Substance and Sexual Activity   • Alcohol use: No   • Drug use: No   • Sexual activity: Defer       FAMILY HISTORY:    Family History   Problem Relation Age of Onset   • Heart disease Father    • Diabetes Father    • Heart disease Mother        Review of Systems   Constitutional: Negative.    HENT: Negative.    Eyes: Positive for visual disturbance (glasses prn).   Respiratory: Negative.    Cardiovascular: Positive for chest pain. Negative for palpitations and leg swelling.   Gastrointestinal: Negative.    Endocrine: Negative.    Genitourinary: Negative.    Musculoskeletal: Negative.    Skin: Negative.    Allergic/Immunologic: Positive for environmental allergies.   Neurological: Negative.    Hematological: Negative.    Psychiatric/Behavioral: Negative.        VISIT VITALS:  Vitals:    05/04/22 1043   BP: 115/75   BP Location: Left arm   Patient Position: Sitting   Pulse: 63   SpO2: 97%   Weight: 91.5 kg (201 lb 12.8 oz)   Height: 180.3 cm (70.98\")      /75 (BP Location: Left arm, Patient " "Position: Sitting)   Pulse 63   Ht 180.3 cm (70.98\")   Wt 91.5 kg (201 lb 12.8 oz)   SpO2 97%   BMI 28.16 kg/m²     RECENT LABS:    Objective       Physical Exam  Vitals and nursing note reviewed.   Constitutional:       General: He is not in acute distress.     Appearance: He is well-developed.   HENT:      Head: Normocephalic and atraumatic.   Eyes:      Conjunctiva/sclera: Conjunctivae normal.      Pupils: Pupils are equal, round, and reactive to light.   Neck:      Vascular: No carotid bruit, hepatojugular reflux or JVD.      Trachea: No tracheal deviation.      Comments: Nl. Carotid upstrokes  Cardiovascular:      Rate and Rhythm: Normal rate and regular rhythm.      Pulses:           Radial pulses are 2+ on the right side and 2+ on the left side.      Heart sounds: Normal heart sounds, S1 normal and S2 normal. No murmur heard.    No friction rub. No S3 or S4 sounds.   Pulmonary:      Effort: Pulmonary effort is normal.      Breath sounds: Normal breath sounds. No wheezing, rhonchi or rales.      Comments: Nl. Expir. Phase  Nl. Breath sound intensity  Abdominal:      General: Bowel sounds are normal. There is no distension or abdominal bruit.      Palpations: Abdomen is soft. There is no mass.      Tenderness: There is no abdominal tenderness. There is no guarding or rebound.      Comments: No organomegaly   Musculoskeletal:         General: No tenderness or deformity. Normal range of motion.      Cervical back: Normal range of motion and neck supple.      Right lower leg: No edema.      Left lower leg: Edema present.      Comments: LLE, trace edema, palpable pedal pulses  RLE, no edema, palpable pedal pulses   Skin:     General: Skin is warm and dry.      Coloration: Skin is not pale.      Findings: No erythema or rash.   Neurological:      Mental Status: He is alert and oriented to person, place, and time.   Psychiatric:         Behavior: Behavior normal.         Thought Content: Thought content normal.  "        Judgment: Judgment normal.         Procedures      Assessment/Plan   #1.  Conduction system abnormalities as described previously.  Dr. Montano placed a second event monitor to see if flecainide is successful in suppressing dysrhythmia.  Mr. Madison continues to have no change in symptoms but he is very minimally symptomatic at this time.  Since the patient has had no noticeable improvement on flecainide I wonder if this medication should be continued but would defer to Dr. Montano in that regard.    2.  Prior episodes of presyncope with only minimal dizziness now.  The patient will continue his event monitor.  I would recommend no change in treatment or further evaluation unless symptoms were to worsen.    3.  Exertional dyspnea.  Mr. Madison performed to high levels of physical activity without difficulty.  I do not think that further evaluation and restratification from the standpoint of ischemia is indicated at this time.    4.  A single episode of chest discomfort very atypical of angina versus nonanginal in etiology.  Given the patient's high level of physical activity without symptoms I do not think that further evaluation is indicated unless symptoms recur and progress.  We did discuss symptoms more typical of angina.    5.  Mr. Madison will follow with Dr. Story as instructed and we will try to help him arrange follow-up with Dr. Montano after repeat event monitoring.  He will follow in our office in 1 year or on a as needed basis as discussed.   Diagnosis Plan   1. Dyspnea on exertion     2. Mixed hyperlipidemia     3. Palpitations     4. RBBB     5. CODY on CPAP     6. Syncope and collapse     7. Precordial pain         No follow-ups on file.         ACP discussion was held with the patient during this visit. Patient has an advance directive (not in EMR), copy requested.          BMI is >= 25 and < 30. (Overweight) The following options were offered after discussion: pcp addressing             Electronically  signed by:    Scribed for Seth Davidson MD by Heidy Lester LPN on May 4, 2022  at 10:47 EDT    I, Seth Davidson MD personally performed the services described in this documentation as scribed by the above named individual in my presence, and it is both accurate and complete. May 4, 2022 10:47 EDT      This note is dictated utilizing voice recognition software.  Although this record has been proof read, transcriptional errors may still be present. If questions occur regarding the content of this record please do not hesitate to call our office.

## 2022-09-12 DIAGNOSIS — N52.9 ERECTILE DYSFUNCTION, UNSPECIFIED ERECTILE DYSFUNCTION TYPE: ICD-10-CM

## 2022-09-12 RX ORDER — TADALAFIL 20 MG/1
TABLET ORAL
Qty: 15 TABLET | Refills: 2 | OUTPATIENT
Start: 2022-09-12

## 2022-10-07 ENCOUNTER — OFFICE VISIT (OUTPATIENT)
Dept: CARDIOLOGY | Facility: CLINIC | Age: 67
End: 2022-10-07

## 2022-10-07 VITALS
HEART RATE: 65 BPM | HEIGHT: 71 IN | BODY MASS INDEX: 28.56 KG/M2 | WEIGHT: 204 LBS | SYSTOLIC BLOOD PRESSURE: 106 MMHG | OXYGEN SATURATION: 97 % | DIASTOLIC BLOOD PRESSURE: 70 MMHG

## 2022-10-07 DIAGNOSIS — R00.2 PALPITATIONS: ICD-10-CM

## 2022-10-07 DIAGNOSIS — I47.1 SVT (SUPRAVENTRICULAR TACHYCARDIA): ICD-10-CM

## 2022-10-07 DIAGNOSIS — I47.1 PAROXYSMAL SVT (SUPRAVENTRICULAR TACHYCARDIA): ICD-10-CM

## 2022-10-07 DIAGNOSIS — R06.09 DYSPNEA ON EXERTION: Primary | ICD-10-CM

## 2022-10-07 PROCEDURE — 93000 ELECTROCARDIOGRAM COMPLETE: CPT | Performed by: STUDENT IN AN ORGANIZED HEALTH CARE EDUCATION/TRAINING PROGRAM

## 2022-10-07 PROCEDURE — 99214 OFFICE O/P EST MOD 30 MIN: CPT | Performed by: STUDENT IN AN ORGANIZED HEALTH CARE EDUCATION/TRAINING PROGRAM

## 2022-10-07 RX ORDER — FLECAINIDE ACETATE 50 MG/1
50 TABLET ORAL 2 TIMES DAILY
Qty: 60 TABLET | Refills: 11 | Status: SHIPPED | OUTPATIENT
Start: 2022-10-07

## 2022-10-07 NOTE — PROGRESS NOTES
Cardiac Electrophysiology Outpatient Note  [facility] at [facility]    Office Visit     Phill Madison  [id number]  04/15/2022    Primary Care Physician: Joey Story MD    Referred By: No ref. provider found    Subjective     Chief Complaint   Patient presents with   • Atrial Fibrillation       History of Present Illness:   Phill Madison is a 67 y.o. male who presents to my electrophysiology clinic for follow-up of palpitations and SVT/atrial fibrillation.  When we saw him initially there was some concern that he may have had episodes of atrial fibrillation.  On review his monitor is very difficult to tell.  We underwent repeat monitoring which showed episodes of SVT, possibly AV node reentry.  There is no clear atrial fibrillation on this monitor.  He overall has been doing okay from a rhythm standpoint.  He is taking flecainide different than prescribed.  He does think it is maybe helping symptoms a little bit.  His main symptom is currently shortness of breath.  He feels quite limited and has not been able to do the activities that he wants to do.  He is wanting this potentially be associated with coronary artery disease.  He also describes some vague central chest discomfort.  This is not necessarily associated with exertion.      Past Medical History:   Diagnosis Date   • Allergic    • Arthritis    • Asthma    • VALLE (dyspnea on exertion)    • Hyperlipidemia    • Palpitation    • Syncope and collapse        Past Surgical History:   Procedure Laterality Date   • COLONOSCOPY     • FOOT SURGERY     • ROTATOR CUFF REPAIR      x 2       Family History   Problem Relation Age of Onset   • Heart disease Father    • Diabetes Father    • Heart disease Mother        Social History     Socioeconomic History   • Marital status:    Tobacco Use   • Smoking status: Never Smoker   • Smokeless tobacco: Never Used   Vaping Use   • Vaping Use: Never used   Substance and Sexual Activity   •  "Alcohol use: No   • Drug use: No   • Sexual activity: Defer         Current Outpatient Medications:   •  aspirin 81 MG EC tablet, Take 81 mg by mouth Daily., Disp: , Rfl:   •  flecainide (TAMBOCOR) 50 MG tablet, Take 1 tablet by mouth 2 (Two) Times a Day., Disp: 60 tablet, Rfl: 11    Allergies:   Allergies   Allergen Reactions   • Sulfa Antibiotics Hives       Objective   Vital Signs: Blood pressure 106/70, pulse 65, height 180.3 cm (71\"), weight 92.5 kg (204 lb), SpO2 97 %.    PHYSICAL EXAM  General appearance: Awake, alert, cooperative  Head: Normocephalic, without obvious abnormality, atraumatic  Neck: No JVD  Lungs: Clear to ascultation bilaterally  Heart: Regular rate and rhythm, no murmurs, 2+ LE pulses, no lower extremity swelling  Skin: Skin color, turgor normal, no rashes or lesions  Neurologic: Grossly normal     No results found for: GLUCOSE, CALCIUM, NA, K, CO2, CL, BUN, CREATININE, EGFRIFAFRI, EGFRIFNONA, BCR, ANIONGAP  No results found for: WBC, HGB, HCT, MCV, PLT  No results found for: INR, PROTIME  No results found for: TSH, R9HVRSB, N1JEFNA, THYROIDAB       Results for orders placed during the hospital encounter of 03/17/22    Adult Transthoracic Echo Complete W/ Cont if Necessary Per Protocol    Interpretation Summary  Technically excellent study.    1.  LV size, function, and wall motion are normal.  Mild concentric left ventricular hypertrophy.  Visually estimated ejection fraction is 55 to 60%.  Grade 1 diastolic dysfunction.  Borderline to minimal biatrial enlargement.  RV size and function are preserved.  No septal defect or intracavitary mass or thrombus.    2.  Valves are morphologically and physiologically normal with no stenotic lesions, valve associated masses or thrombi, or hemodynamically significant regurgitation.  There is trivial to mild MR, trivial AI, and trivial TR.    3.  No pericardial or great vessel pathology.    4.  Normal pulmonary pressures.         I personally viewed and " interpreted the patient's EKG/Telemetry/lab data      ECG 12 Lead    Date/Time: 10/7/2022 7:54 PM  Performed by: Tj Montano MD  Authorized by: Tj Montano MD   Comparison: compared with previous ECG from 4/15/2022  Similar to previous ECG  Comments: Sinus rhythm, right bundle branch block            Phill Madison  reports that he has never smoked. He has never used smokeless tobacco.    Assessment & Plan    1. SVT  -Patient's most recent monitor showed episodes of SVT.  This is possibly AV node reentry versus atrial flutter.  I would favor AV node reentry but I cannot say for sure.  At the current time he does not have symptoms of palpitations that are bothering him.  His main symptom is shortness of breath.  We discussed that should he have continued shortness of breath despite further work-up then we could consider more additional rhythm control options.  He is currently taking flecainide, but not exactly as prescribed.  He does think this is helping him somewhat so we will continue it for now.  Pending results of further work-up we will discuss further options for rhythm control.  He has a [id number] score of only 1.  If these are episodes of atrial flutter they are relatively short-lived and with his low CHADS2 Vascor I think it is reasonable to continue aspirin for now.    2.  Dyspnea on exertion  He feels that he is quite limited by his dyspnea.  Unsure the etiology of this at the current time.  He did have an echo in the spring which showed overall normal ejection fraction with stage I diastolic dysfunction.  As he is quite bothered by his symptoms we will go ahead and proceed with nuclear stress testing as well as pulmonary function testing for further work-up of his dyspnea.      Follow Up:  Return in about 6 months (around 4/7/2023).        Thank you for allowing me to participate in the care of your patient. Please do not hesitate to contact me with additional questions or concerns.      Tj  NAHUM Montano.  Cardiac Electrophysiologist  [facility] / [facility]

## 2022-10-26 ENCOUNTER — HOSPITAL ENCOUNTER (OUTPATIENT)
Dept: NUCLEAR MEDICINE | Facility: HOSPITAL | Age: 67
Discharge: HOME | End: 2022-10-26

## 2022-10-26 ENCOUNTER — HOSPITAL ENCOUNTER (OUTPATIENT)
Dept: PULMONOLOGY | Facility: HOSPITAL | Age: 67
Discharge: HOME | End: 2022-10-26
Admitting: STUDENT IN AN ORGANIZED HEALTH CARE EDUCATION/TRAINING PROGRAM

## 2022-10-26 DIAGNOSIS — I47.1 PAROXYSMAL SVT (SUPRAVENTRICULAR TACHYCARDIA): ICD-10-CM

## 2022-10-26 DIAGNOSIS — R00.2 PALPITATIONS: ICD-10-CM

## 2022-10-26 DIAGNOSIS — R06.09 DYSPNEA ON EXERTION: ICD-10-CM

## 2022-10-26 LAB
BH CV REST NUCLEAR ISOTOPE DOSE: 10.2 MCI
BH CV STRESS DURATION MIN STAGE 1: 3
BH CV STRESS DURATION SEC STAGE 1: 0
BH CV STRESS GRADE STAGE 1: 10
BH CV STRESS METS STAGE 1: 5
BH CV STRESS NUCLEAR ISOTOPE DOSE: 36 MCI
BH CV STRESS PROTOCOL 1: NORMAL
BH CV STRESS RECOVERY BP: NORMAL MMHG
BH CV STRESS RECOVERY HR: 75 BPM
BH CV STRESS SPEED STAGE 1: 1.7
BH CV STRESS STAGE 1: 1
LV EF NUC BP: 53 %
MAXIMAL PREDICTED HEART RATE: 152 BPM
PERCENT MAX PREDICTED HR: 117.76 %
STRESS BASELINE BP: NORMAL MMHG
STRESS BASELINE HR: 50 BPM
STRESS PERCENT HR: 139 %
STRESS POST EXERCISE DUR MIN: 12 MIN
STRESS POST EXERCISE DUR SEC: 18 SEC
STRESS POST PEAK BP: NORMAL MMHG
STRESS POST PEAK HR: 179 BPM
STRESS TARGET HR: 129 BPM

## 2022-10-26 PROCEDURE — 0 TECHNETIUM SESTAMIBI: Performed by: STUDENT IN AN ORGANIZED HEALTH CARE EDUCATION/TRAINING PROGRAM

## 2022-10-26 PROCEDURE — A9500 TC99M SESTAMIBI: HCPCS | Performed by: STUDENT IN AN ORGANIZED HEALTH CARE EDUCATION/TRAINING PROGRAM

## 2022-10-26 PROCEDURE — 94726 PLETHYSMOGRAPHY LUNG VOLUMES: CPT

## 2022-10-26 PROCEDURE — 94729 DIFFUSING CAPACITY: CPT | Performed by: INTERNAL MEDICINE

## 2022-10-26 PROCEDURE — 93017 CV STRESS TEST TRACING ONLY: CPT

## 2022-10-26 PROCEDURE — 78452 HT MUSCLE IMAGE SPECT MULT: CPT | Performed by: INTERNAL MEDICINE

## 2022-10-26 PROCEDURE — 93018 CV STRESS TEST I&R ONLY: CPT | Performed by: INTERNAL MEDICINE

## 2022-10-26 PROCEDURE — 94010 BREATHING CAPACITY TEST: CPT | Performed by: INTERNAL MEDICINE

## 2022-10-26 PROCEDURE — 94729 DIFFUSING CAPACITY: CPT

## 2022-10-26 PROCEDURE — 94726 PLETHYSMOGRAPHY LUNG VOLUMES: CPT | Performed by: INTERNAL MEDICINE

## 2022-10-26 PROCEDURE — 94010 BREATHING CAPACITY TEST: CPT

## 2022-10-26 PROCEDURE — 78452 HT MUSCLE IMAGE SPECT MULT: CPT

## 2022-10-26 RX ADMIN — TECHNETIUM TC 99M SESTAMIBI 1 DOSE: 1 INJECTION INTRAVENOUS at 07:53

## 2022-10-26 RX ADMIN — TECHNETIUM TC 99M SESTAMIBI 1 DOSE: 1 INJECTION INTRAVENOUS at 09:50

## 2022-10-27 ENCOUNTER — TELEPHONE (OUTPATIENT)
Dept: CARDIOLOGY | Facility: CLINIC | Age: 67
End: 2022-10-27

## 2022-10-27 NOTE — TELEPHONE ENCOUNTER
----- Message from Tj Montano MD sent at 10/27/2022 12:05 PM EDT -----  Can we let him know his stress test looked good  ----- Message -----  From: Christiano Sutherland MD  Sent: 10/26/2022   1:40 PM EDT  To: Tj Montano MD

## 2023-10-04 ENCOUNTER — OFFICE VISIT (OUTPATIENT)
Dept: CARDIOLOGY | Facility: CLINIC | Age: 68
End: 2023-10-04
Payer: MEDICARE

## 2023-10-04 VITALS
WEIGHT: 213 LBS | BODY MASS INDEX: 29.82 KG/M2 | OXYGEN SATURATION: 96 % | SYSTOLIC BLOOD PRESSURE: 110 MMHG | HEART RATE: 61 BPM | DIASTOLIC BLOOD PRESSURE: 69 MMHG | HEIGHT: 71 IN

## 2023-10-04 DIAGNOSIS — R07.2 PRECORDIAL PAIN: ICD-10-CM

## 2023-10-04 DIAGNOSIS — R55 SYNCOPE AND COLLAPSE: ICD-10-CM

## 2023-10-04 DIAGNOSIS — I47.10 SVT (SUPRAVENTRICULAR TACHYCARDIA): ICD-10-CM

## 2023-10-04 DIAGNOSIS — R00.2 PALPITATIONS: ICD-10-CM

## 2023-10-04 DIAGNOSIS — E78.2 MIXED HYPERLIPIDEMIA: Primary | ICD-10-CM

## 2023-10-04 DIAGNOSIS — I48.0 PAROXYSMAL ATRIAL FIBRILLATION: ICD-10-CM

## 2023-10-04 DIAGNOSIS — G47.33 OSA ON CPAP: ICD-10-CM

## 2023-10-04 DIAGNOSIS — I45.10 RBBB: ICD-10-CM

## 2023-10-04 PROCEDURE — 99214 OFFICE O/P EST MOD 30 MIN: CPT | Performed by: INTERNAL MEDICINE

## 2023-10-04 PROCEDURE — 93000 ELECTROCARDIOGRAM COMPLETE: CPT | Performed by: INTERNAL MEDICINE

## 2023-10-04 NOTE — PROGRESS NOTES
"Subjective   Phill Madison is a 68 y.o. male     Chief Complaint   Patient presents with    Follow-up     Here for yearly f/u    Hyperlipidemia    Palpitations    Sleep Apnea    Syncope       PROBLEM LIST:     1. Hyperlipidemia  2. VALLE  3. Palpitations  3.1 Event monitor, 3- - 4-9-2022, sinus with sustained runs SVT freq. > 1 min with rate to 165, occas. PVC'S, occas. NSVT. TX'D with Flec. Referred to EP  4. Syncope and collapse, last episode approx. 2 yrs. Ago  4.1 Tlit Table, 8-1-17, pos. For neurocardiogenic syncope  5. Asthma 25 yrs. ago, reports cured with acupuncture  6. RBBB per EKG  7. CODY, uses CPAP    Specialty Problems          Cardiology Problems    Mixed hyperlipidemia        Palpitations        RBBB             HPI:    Mr. Madison returns for follow-up on the above.    He continues to function at very high levels of physical activity without symptoms.  Dr. Montano ordered a stress test because of persistent dyspnea.  Mr. Madison was able to exercise over 13 minutes on a Jamal protocol without symptoms, EKG changes, or scintigraphic evidence of ischemia.  Apparently heart rate and blood pressure responses were physiologic.    Mr. Madison denies orthopnea, PND, or significant lower extremity edema.  He senses no palpitations and noted no difference in his functional capacity after stopping flecainide.  He continues to have extremely short-lived episodes when he feels like he might begin to \"brown out\" like he has done in the past but these symptoms are always self-limited and he has none of the severe symptomatology that he described previously.  He does not claudicate describe other symptoms of peripheral arterial disease and he has had no symptoms of arterial embolic events to include no symptoms of TIA or stroke.    Blood pressures have been well controlled.  We have no recent fasting lipid profile data.                        PRIOR MEDICATIONS    Current Outpatient Medications on File Prior to Visit " "  Medication Sig Dispense Refill    aspirin 81 MG EC tablet Take 81 mg by mouth Daily.      flecainide (TAMBOCOR) 50 MG tablet Take 1 tablet by mouth 2 (Two) Times a Day. 60 tablet 11     No current facility-administered medications on file prior to visit.       ALLERGIES:    Sulfa antibiotics    PAST MEDICAL HISTORY:    Past Medical History:   Diagnosis Date    Allergic     Arthritis     Asthma     VALLE (dyspnea on exertion)     Hyperlipidemia     Palpitation     Syncope and collapse        SURGICAL HISTORY:    Past Surgical History:   Procedure Laterality Date    COLONOSCOPY      FOOT SURGERY      ROTATOR CUFF REPAIR      x 2       SOCIAL HISTORY:    Social History     Socioeconomic History    Marital status:    Tobacco Use    Smoking status: Never    Smokeless tobacco: Never   Vaping Use    Vaping Use: Never used   Substance and Sexual Activity    Alcohol use: No    Drug use: No    Sexual activity: Defer       FAMILY HISTORY:    Family History   Problem Relation Age of Onset    Heart disease Father     Diabetes Father     Heart disease Mother        Review of Systems   Constitutional:  Positive for fatigue (more easily).   HENT: Negative.     Eyes:  Positive for visual disturbance (glasses prn).   Respiratory:  Positive for shortness of breath.         Denies orthopnea/PND   Cardiovascular: Negative.    Gastrointestinal: Negative.    Endocrine: Negative.    Genitourinary: Negative.    Musculoskeletal:  Positive for arthralgias and myalgias.   Skin: Negative.    Allergic/Immunologic: Negative.    Neurological:  Positive for light-headedness. Negative for syncope. Facial asymmetry: lasts for a second.  Hematological: Negative.    Psychiatric/Behavioral: Negative.       VISIT VITALS:  Vitals:    10/04/23 0952   Height: 180.3 cm (70.98\")      Ht 180.3 cm (70.98\")   BMI 28.47 kg/m²     RECENT LABS:    Objective       Physical Exam  Vitals and nursing note reviewed.   Constitutional:       General: He is not in " acute distress.     Appearance: He is well-developed.   HENT:      Head: Normocephalic and atraumatic.   Eyes:      Conjunctiva/sclera: Conjunctivae normal.      Pupils: Pupils are equal, round, and reactive to light.   Neck:      Vascular: No carotid bruit, hepatojugular reflux or JVD.      Trachea: No tracheal deviation.      Comments: Nl. Carotid upstrokes  Cardiovascular:      Rate and Rhythm: Normal rate and regular rhythm.      Pulses:           Radial pulses are 2+ on the right side and 2+ on the left side.      Heart sounds: Normal heart sounds, S1 normal and S2 normal. No murmur heard.    No friction rub. S4 sounds present. No S3 sounds.   Pulmonary:      Effort: Pulmonary effort is normal.      Breath sounds: Normal breath sounds. No wheezing, rhonchi or rales.      Comments: Nl. Expir. Phase  Nl. Breath sound intensity    Abdominal:      General: Bowel sounds are normal. There is no distension or abdominal bruit.      Palpations: Abdomen is soft. There is no mass.      Tenderness: There is no abdominal tenderness. There is no guarding or rebound.      Comments: No organomegaly   Musculoskeletal:         General: No tenderness or deformity. Normal range of motion.      Cervical back: Normal range of motion and neck supple.      Right lower leg: No edema.      Left lower leg: Edema present.      Comments: LLE, trace edema, palpable PT pedal pulse  RLE, no edema, palpable PT pedal pulse   Skin:     General: Skin is warm and dry.      Coloration: Skin is not pale.      Findings: No erythema or rash.   Neurological:      Mental Status: He is alert and oriented to person, place, and time.   Psychiatric:         Behavior: Behavior normal.         Thought Content: Thought content normal.         Judgment: Judgment normal.         ECG 12 Lead    Date/Time: 10/4/2023 10:12 AM  Performed by: Seth Davidson MD  Authorized by: Seth Davidson MD   Comparison: compared with previous ECG from 10/7/2022  Comments:  Sinus at 55 with right bundle branch block.  QRS axis is normalized from prior otherwise no change.          Assessment & Plan   #1.  History of syncope in the distant past and presyncope in the relatively distant past with only minimal symptoms currently.  Event monitoring demonstrated SVT but the patient had no improvement in symptoms on flecainide.  He has been able to maintain high levels of physical activity off that medication.  I see no rationale for restarting that medication.    2.  Exertional dyspnea.  Pulmonary function studies were completely normal and Mr. Madison performs at very high levels of physical activity symptoms as documented on his treadmill stress test.  We will continue to monitor.    3.  The patient had multiple questions today regarding general health measures, the trivial MR TR and AI on prior echo, about restarting flecainide etc. each of these was answered to the patient's satisfaction.    4.  Mr. Madison will follow with Luci Singh as instructed and we will plan on seeing him in follow-up in 1 year or on appearing basis as discussed.   Diagnosis Plan   1. Mixed hyperlipidemia        2. Palpitations        3. Precordial pain        4. RBBB        5. CODY on CPAP        6. Syncope and collapse            No follow-ups on file.         Phill Madison  reports that he has never smoked. He has never used smokeless tobacco.. I have educated him on the risk of diseases from using tobacco products such as cancer, COPD, and heart disease.                Advance Care Planning   ACP discussion was held with the patient during this visit. Patient has an advance directive (not in EMR), copy requested.                   Electronically signed by:    Scribed for Seth Davidson MD by Heidy Lester LPN on October 4, 2023  at 09:54 EDT    I, Seth Davidson MD personally performed the services described in this documentation as scribed by the above named individual in my presence, and it is both accurate and  complete. October 4, 2023 09:54 EDT      Dictated Utilizing Dragon Dictation: Part of this note may be an electronic transcription/translation of spoken language to printed text using the Dragon Dictation System.

## 2023-10-18 NOTE — TELEPHONE ENCOUNTER
SERIOUS DAY 5 CARDIAC REPORT FROM 3- AT 6:07 PM CT REVIEWED ALONG WITH CHART PER DR. LAU. AWARE PATIENT HAS ECHO SCHEDULED FOR [facility]. V/O RECEIVED TO HAVE PATIENT F/U HERE IN 1-2 WEEKS. WILL DISCUSS ABOVE WITH MR. JAREK BANKSMeggan AT ECHO APPT. PH,LPN    3- RECEIVED MORE SVT EPISODES PER EVENT MONITOR, REVIEWED PER KING ORTIZ, АННА. V/O RECEIVED TO SEND IN METOPROLOL SUCCINATE 25 MG DAILY. SPOKE WITH PATIENT DOWNSTAIRS PRIOR TO ECHO APPT. REVIEWED SVT EPISODES WITH HIM. HE STATES HE ALWAYS A LITTLE LIGHTHEADED, BUT DENIES EVER FEELING ANY RACING, PALPS ETC. HE COULD NOT COORELATE ANY SX'S AT SPECIFIC TIMES OF EPISODES. PATIENT AWARE METOPROLOL BEING SENT IN TO [facility], AWARE TO START TODAY, AND 3-23 F/U APPT. GIVEN TO HIM. VERBALIZED HE UNDERSTOOD. PH,LPN  
What Type Of Note Output Would You Prefer (Optional)?: Standard Output
What Is The Reason For Today's Visit?: Full Body Skin Examination
What Is The Reason For Today's Visit? (Being Monitored For X): the development of a new lesion

## 2023-11-28 ENCOUNTER — TELEPHONE (OUTPATIENT)
Dept: CARDIOLOGY | Facility: CLINIC | Age: 68
End: 2023-11-28
Payer: MEDICARE

## 2023-11-28 NOTE — TELEPHONE ENCOUNTER
Received cardiac clearance request from [facility] stating pt has vitnectomy membrane peel to be scheduled and is requiring a cardiac clearance. Placed cardiac clearance request in Pat's inbox to review and address with provider.

## 2023-11-28 NOTE — TELEPHONE ENCOUNTER
Pt presented in office w/ a form from [facility] [place] stating he needs an echo and a carotid doppler performed ASAP for a surgery. Stated that his retina surgery on left eye is tentatively scheduled for 12/7.       I called [facility] at [phone], spoke w/ Raji, explained that we need a faxed cardiac clearance request from them and that if they require the above mentioned tests that we will likely be unable to get both performed and resulted by 12/7.   She stated pt just left their [place] location, so surgery is not yet scheduled, note isn't done, etc. Stated that if pt has a cardiac history, they will likely just need a clearance letter.   She stated that she is unsure they're needing tests at this time. I advised that we will just need a request for clearance on their letterhead and if they need tests to make sure that is listed. She verbalized understanding.

## 2023-12-04 ENCOUNTER — TELEPHONE (OUTPATIENT)
Dept: CARDIOLOGY | Facility: CLINIC | Age: 68
End: 2023-12-04
Payer: MEDICARE

## 2023-12-04 DIAGNOSIS — I47.10 SVT (SUPRAVENTRICULAR TACHYCARDIA): ICD-10-CM

## 2023-12-04 DIAGNOSIS — I45.10 RBBB: ICD-10-CM

## 2023-12-04 DIAGNOSIS — Z01.818 PRE-OPERATIVE CLEARANCE: Primary | ICD-10-CM

## 2023-12-04 DIAGNOSIS — I48.0 PAROXYSMAL ATRIAL FIBRILLATION: ICD-10-CM

## 2023-12-04 DIAGNOSIS — R00.2 PALPITATIONS: ICD-10-CM

## 2023-12-04 NOTE — TELEPHONE ENCOUNTER
PER DR. LAU, ACCEPTABLE RISK FOR VITRECTOMY, MEMBRANE PEEL PER [facility]   SCHEDULED FOR 12-7-2023 PER PATIENT AND CAN  HOLD ASA X 5 DAYS IF NEEDED. OK TO ORDER CBC/BMP/EKG TO BE DONE PRIOR TO SURGERY. PATIENT COMING [facility]. FOR LABS DOWNSTAIRS AND EKG. ONCE RESULTS RECEIVED WILL FAX 2 PAGE FAX RECEIVED, LAB RESULTS, EKG, AND CLEARANCE LETTER. PH,LPN

## 2023-12-05 ENCOUNTER — LAB (OUTPATIENT)
Dept: LAB | Facility: HOSPITAL | Age: 68
End: 2023-12-05
Payer: MEDICARE

## 2023-12-05 ENCOUNTER — TELEPHONE (OUTPATIENT)
Dept: CARDIOLOGY | Facility: CLINIC | Age: 68
End: 2023-12-05
Payer: MEDICARE

## 2023-12-05 DIAGNOSIS — I45.10 RBBB: ICD-10-CM

## 2023-12-05 DIAGNOSIS — I48.0 PAROXYSMAL ATRIAL FIBRILLATION: ICD-10-CM

## 2023-12-05 DIAGNOSIS — Z01.818 PRE-OPERATIVE CLEARANCE: ICD-10-CM

## 2023-12-05 DIAGNOSIS — R00.2 PALPITATIONS: ICD-10-CM

## 2023-12-05 DIAGNOSIS — I47.10 SVT (SUPRAVENTRICULAR TACHYCARDIA): ICD-10-CM

## 2023-12-05 LAB
ANION GAP SERPL CALCULATED.3IONS-SCNC: 9.9 MMOL/L (ref 5–15)
BUN SERPL-MCNC: 11 MG/DL (ref 8–23)
BUN/CREAT SERPL: 11.3 (ref 7–25)
CALCIUM SPEC-SCNC: 9.4 MG/DL (ref 8.6–10.5)
CHLORIDE SERPL-SCNC: 101 MMOL/L (ref 98–107)
CO2 SERPL-SCNC: 28.1 MMOL/L (ref 22–29)
CREAT SERPL-MCNC: 0.97 MG/DL (ref 0.76–1.27)
EGFRCR SERPLBLD CKD-EPI 2021: 84.5 ML/MIN/1.73
GLUCOSE SERPL-MCNC: 100 MG/DL (ref 65–99)
MAGNESIUM SERPL-MCNC: 2.2 MG/DL (ref 1.6–2.4)
POTASSIUM SERPL-SCNC: 3.8 MMOL/L (ref 3.5–5.2)
SODIUM SERPL-SCNC: 139 MMOL/L (ref 136–145)

## 2023-12-05 PROCEDURE — 80048 BASIC METABOLIC PNL TOTAL CA: CPT | Performed by: INTERNAL MEDICINE

## 2023-12-05 PROCEDURE — 83735 ASSAY OF MAGNESIUM: CPT | Performed by: INTERNAL MEDICINE

## 2023-12-05 NOTE — TELEPHONE ENCOUNTER
Patient presented in office for labs and EKG for clearance. He thought he was scheduled for echo and carotid as well. Called Dr Cartwright's nurse at [facility]. Cardiac clearance approval has been obtained. No further testing warranted prior to surgery. Patient notified.

## 2023-12-06 ENCOUNTER — TELEPHONE (OUTPATIENT)
Dept: CARDIOLOGY | Facility: CLINIC | Age: 68
End: 2023-12-06
Payer: MEDICARE

## 2023-12-06 ENCOUNTER — LAB (OUTPATIENT)
Dept: LAB | Facility: HOSPITAL | Age: 68
End: 2023-12-06
Payer: MEDICARE

## 2023-12-06 DIAGNOSIS — E78.2 MIXED HYPERLIPIDEMIA: ICD-10-CM

## 2023-12-06 DIAGNOSIS — R00.2 PALPITATIONS: ICD-10-CM

## 2023-12-06 DIAGNOSIS — R07.2 PRECORDIAL PAIN: ICD-10-CM

## 2023-12-06 DIAGNOSIS — R06.09 DYSPNEA ON EXERTION: ICD-10-CM

## 2023-12-06 DIAGNOSIS — E78.2 MIXED HYPERLIPIDEMIA: Primary | ICD-10-CM

## 2023-12-06 DIAGNOSIS — R55 SYNCOPE AND COLLAPSE: ICD-10-CM

## 2023-12-06 LAB
BASOPHILS # BLD AUTO: 0.03 10*3/MM3 (ref 0–0.2)
BASOPHILS NFR BLD AUTO: 0.5 % (ref 0–1.5)
DEPRECATED RDW RBC AUTO: 43.5 FL (ref 37–54)
EOSINOPHIL # BLD AUTO: 0.07 10*3/MM3 (ref 0–0.4)
EOSINOPHIL NFR BLD AUTO: 1.2 % (ref 0.3–6.2)
ERYTHROCYTE [DISTWIDTH] IN BLOOD BY AUTOMATED COUNT: 12.7 % (ref 12.3–15.4)
HCT VFR BLD AUTO: 47.1 % (ref 37.5–51)
HGB BLD-MCNC: 15.2 G/DL (ref 13–17.7)
IMM GRANULOCYTES # BLD AUTO: 0.01 10*3/MM3 (ref 0–0.05)
IMM GRANULOCYTES NFR BLD AUTO: 0.2 % (ref 0–0.5)
LYMPHOCYTES # BLD AUTO: 1.91 10*3/MM3 (ref 0.7–3.1)
LYMPHOCYTES NFR BLD AUTO: 31.8 % (ref 19.6–45.3)
MCH RBC QN AUTO: 30.1 PG (ref 26.6–33)
MCHC RBC AUTO-ENTMCNC: 32.3 G/DL (ref 31.5–35.7)
MCV RBC AUTO: 93.3 FL (ref 79–97)
MONOCYTES # BLD AUTO: 0.42 10*3/MM3 (ref 0.1–0.9)
MONOCYTES NFR BLD AUTO: 7 % (ref 5–12)
NEUTROPHILS NFR BLD AUTO: 3.56 10*3/MM3 (ref 1.7–7)
NEUTROPHILS NFR BLD AUTO: 59.3 % (ref 42.7–76)
NRBC BLD AUTO-RTO: 0 /100 WBC (ref 0–0.2)
PLATELET # BLD AUTO: 231 10*3/MM3 (ref 140–450)
PMV BLD AUTO: 9.7 FL (ref 6–12)
RBC # BLD AUTO: 5.05 10*6/MM3 (ref 4.14–5.8)
WBC NRBC COR # BLD AUTO: 6 10*3/MM3 (ref 3.4–10.8)

## 2023-12-06 PROCEDURE — 85025 COMPLETE CBC W/AUTO DIFF WBC: CPT | Performed by: INTERNAL MEDICINE

## 2023-12-06 NOTE — TELEPHONE ENCOUNTER
Patient left message for Lesley FLORES to have all correspondence faxed to his doctor at [phone]. He would also like to have PT-INR completed.

## 2023-12-06 NOTE — TELEPHONE ENCOUNTER
"Jessica called stating pt is downstairs for labs, stated pt spoke w/ Pat but they don't have orders.       Per chart review TE from Pat on 12/4/23 states:  \"OK TO ORDER CBC/BMP/EKG TO BE DONE PRIOR TO SURGERY.\" But pt had a CMP and Mag done yesterday, as there was no CBC order. Entered CBC order.   "

## 2024-10-10 ENCOUNTER — OFFICE VISIT (OUTPATIENT)
Dept: CARDIOLOGY | Facility: CLINIC | Age: 69
End: 2024-10-10
Payer: MEDICARE

## 2024-10-10 VITALS
DIASTOLIC BLOOD PRESSURE: 72 MMHG | HEIGHT: 70 IN | HEART RATE: 62 BPM | WEIGHT: 200.4 LBS | BODY MASS INDEX: 28.69 KG/M2 | OXYGEN SATURATION: 99 % | SYSTOLIC BLOOD PRESSURE: 132 MMHG

## 2024-10-10 DIAGNOSIS — R06.09 DYSPNEA ON EXERTION: Primary | ICD-10-CM

## 2024-10-10 DIAGNOSIS — G47.33 OSA ON CPAP: ICD-10-CM

## 2024-10-10 DIAGNOSIS — I45.10 RBBB: ICD-10-CM

## 2024-10-10 DIAGNOSIS — R55 SYNCOPE AND COLLAPSE: ICD-10-CM

## 2024-10-10 DIAGNOSIS — R07.2 PRECORDIAL PAIN: ICD-10-CM

## 2024-10-10 DIAGNOSIS — E78.2 MIXED HYPERLIPIDEMIA: ICD-10-CM

## 2024-10-10 DIAGNOSIS — I47.10 SVT (SUPRAVENTRICULAR TACHYCARDIA): ICD-10-CM

## 2024-10-10 DIAGNOSIS — R00.2 PALPITATIONS: ICD-10-CM

## 2024-10-10 PROCEDURE — 93000 ELECTROCARDIOGRAM COMPLETE: CPT | Performed by: INTERNAL MEDICINE

## 2024-10-10 PROCEDURE — 99214 OFFICE O/P EST MOD 30 MIN: CPT | Performed by: INTERNAL MEDICINE

## 2024-10-10 RX ORDER — ASPIRIN 81 MG/1
81 TABLET ORAL DAILY
Start: 2024-10-10

## 2024-10-10 RX ORDER — ESCITALOPRAM OXALATE 5 MG/1
5 TABLET ORAL DAILY
Qty: 30 TABLET | Refills: 5 | Status: SHIPPED | OUTPATIENT
Start: 2024-10-10

## 2024-10-10 NOTE — PROGRESS NOTES
Subjective   Phill Madison is a 69 y.o. male     Chief Complaint   Patient presents with    Follow-up     Here for yearly f/u    Hyperlipidemia    Shortness of Breath    Palpitations    Sleep Apnea    Syncope       PROBLEM LIST:     1. Hyperlipidemia  2. VALLE  3. Palpitations  3.1 Event monitor, 3- - 4-9-2022, sinus with sustained runs SVT freq. > 1 min with rate to 165, occas. PVC'S, occas. NSVT. TX'D with Flec. Referred to EP  4. Syncope and collapse, last episode approx. 2 yrs. Ago  4.1 Tlit Table, 8-1-17, pos. For neurocardiogenic syncope  5. Asthma 25 yrs. ago, reports cured with acupuncture  6. RBBB per EKG  7. CODY, uses CPAP    Specialty Problems          Cardiology Problems    Mixed hyperlipidemia        Palpitations        RBBB             HPI:  Mr. Madison returns for follow-up on the above.    He had an episode of syncope approximately 1 month ago which was similar to the patient's previously described loss of consciousness.  He was standing in the kitchen making coffee, felt himself about to pass out, and tried to make it to a chair.  He woke up on the floor with no loss of bowel or bladder control, no postictal state, and no Los's paralysis.  Mr. Madison had no palpitations or chest pain associated with loss of consciousness.  He describes severe anxiety and stress at the time of this event and also relates that most of his previous episodes had similar precipitating factors    Mr. Madison describes a decrease in functional capacity but feels this is not disproportionate to his decrease in physical activity.  He continues to be without chest pain, orthopnea, PND, or lower extremity edema.  He senses no palpitations.  He has no orthostatic symptomatology.    With a history of atrial fibrillation Mr. Madison was placed on flecainide.  This had no effect on his symptoms.  Because of a history of cardiogenic syncope with positive tilt test he was placed on metoprolol.  This was tolerated for only short period  because of hypotension and bradycardia.  Mr. Madison cannot recollect any improvement in symptoms for the short time he was on that medication.                  PRIOR MEDICATIONS    Current Outpatient Medications on File Prior to Visit   Medication Sig Dispense Refill    aspirin 81 MG EC tablet Take 1 tablet by mouth. Occas.      Tadalafil (CIALIS PO) Take  by mouth. Takes prn for prostate       No current facility-administered medications on file prior to visit.       ALLERGIES:    Sulfa antibiotics    PAST MEDICAL HISTORY:    Past Medical History:   Diagnosis Date    Allergic     Arthritis     Asthma     VALLE (dyspnea on exertion)     Hyperlipidemia     Palpitation     Syncope and collapse        SURGICAL HISTORY:    Past Surgical History:   Procedure Laterality Date    COLONOSCOPY      FOOT SURGERY      ROTATOR CUFF REPAIR      x 2       SOCIAL HISTORY:    Social History     Socioeconomic History    Marital status:    Tobacco Use    Smoking status: Never    Smokeless tobacco: Never   Vaping Use    Vaping status: Never Used   Substance and Sexual Activity    Alcohol use: No    Drug use: No    Sexual activity: Defer       FAMILY HISTORY:    Family History   Problem Relation Age of Onset    Heart disease Father     Diabetes Father     Heart disease Mother        Review of Systems   Constitutional:  Positive for fatigue.   HENT: Negative.     Eyes:  Positive for visual disturbance (reading glasses prn).   Respiratory: Negative.     Cardiovascular: Negative.    Gastrointestinal: Negative.    Endocrine: Negative.    Genitourinary: Negative.    Musculoskeletal:  Positive for arthralgias and myalgias.   Skin: Negative.    Allergic/Immunologic: Negative.    Neurological:  Positive for dizziness, syncope (last epispde approx. 1 mo. ago while standing in kitchen and woke up on the floor.) and light-headedness.   Hematological:  Bruises/bleeds easily.   Psychiatric/Behavioral: Negative.         VISIT VITALS:  Vitals:     "10/10/24 0935   BP: 132/72   BP Location: Left arm   Patient Position: Sitting   Pulse: 62   SpO2: 99%   Weight: 90.9 kg (200 lb 6.4 oz)   Height: 177.8 cm (70\")      /72 (BP Location: Left arm, Patient Position: Sitting)   Pulse 62   Ht 177.8 cm (70\")   Wt 90.9 kg (200 lb 6.4 oz)   SpO2 99%   BMI 28.75 kg/m²     RECENT LABS:    Objective       Physical Exam  Vitals and nursing note reviewed.   Constitutional:       General: He is not in acute distress.     Appearance: He is well-developed.   HENT:      Head: Normocephalic and atraumatic.   Eyes:      Conjunctiva/sclera: Conjunctivae normal.      Pupils: Pupils are equal, round, and reactive to light.   Neck:      Vascular: No carotid bruit, hepatojugular reflux or JVD.      Trachea: No tracheal deviation.      Comments: Nl. Carotid upstrokes  Cardiovascular:      Rate and Rhythm: Normal rate and regular rhythm.      Pulses:           Radial pulses are 2+ on the right side and 2+ on the left side.      Heart sounds: Normal heart sounds, S1 normal and S2 normal. No murmur heard.     No friction rub. No S3 or S4 sounds.   Pulmonary:      Effort: Pulmonary effort is normal.      Breath sounds: Normal breath sounds. No wheezing, rhonchi or rales.      Comments: Nl. Expir. Phase  Nl. Breath sound intensity    Abdominal:      General: Bowel sounds are normal. There is no distension or abdominal bruit.      Palpations: Abdomen is soft. There is no mass.      Tenderness: There is no abdominal tenderness. There is no guarding or rebound.      Comments: No organomegaly   Musculoskeletal:         General: No tenderness or deformity. Normal range of motion.      Cervical back: Normal range of motion and neck supple.      Right lower leg: No edema.      Left lower leg: No edema.      Comments: LLE, no edema  RLE, sock indention, no edema  Pedal pulses not assessed   Skin:     General: Skin is warm and dry.      Coloration: Skin is not pale.      Findings: No erythema " or rash.   Neurological:      Mental Status: He is alert and oriented to person, place, and time.   Psychiatric:         Behavior: Behavior normal.         Thought Content: Thought content normal.         Judgment: Judgment normal.           ECG 12 Lead    Date/Time: 10/10/2024 9:45 AM  Performed by: Seth Davidson MD    Authorized by: Seth Davidson MD  Comparison: compared with previous ECG from 12/5/2023  Comments: Sinus at 57.  Right bundle branch block with normal axis.  No significant change from prior.            Assessment & Plan   #1.  Neurocardiogenic syncope.  The patient did not tolerate beta-blocker therapy.  I would like to trial very low-dose SSRI in the form of Lexapro 5 mg daily.  This may also dressed, to some degree, the patient's situational anxiety.  He was given precautions reference sleep disturbance, GI issues, sexual dysfunction etc.    2.  SVT.  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  [id number] 2 score remains 1 for age.  The patient was instructed to take aspirin on a daily basis.  If her PT directed at neurocardiogenic syncope is unsuccessful or not tolerated we would need to consider antidysrhythmic therapy other than flecainide.    3.  Decreased functional capacity and increased exertional dyspnea.  I believe this is due to the patient's decreased physical activity.  Mr. Madison will make a concerted effort to regain his prior functional level.  This may also address symptoms related to neurocardiogenic syncope as well as shortness of breath and chest discomfort.    4.  Mr. Madison will follow with Luci Singh as instructed we will plan on seeing him in follow-up in 3 months or on appearing basis for symptoms as discussed.   Diagnosis Plan   1. Dyspnea on exertion        2. Mixed hyperlipidemia        3. Palpitations        4. Precordial pain        5. RBBB        6. CODY on CPAP        7. Syncope  and collapse            No follow-ups on file.         Phill Madison  reports that he has never smoked. He has never used smokeless tobacco. I have educated him on the risk of diseases from using tobacco products such as cancer, COPD, and heart disease.     Advance Care Planning   ACP discussion was held with the patient during this visit. Patient has an advance directive (not in EMR), copy requested.              BMI is >= 25 and <30. (Overweight) The following options were offered after discussion;: pcp addressing               Electronically signed by:    Scribed for Seth Davidson MD by Heidy Lester LPN on October 10, 2024  at 09:45 EDT    I, Seth Davidson MD personally performed the services described in this documentation as scribed by the above named individual in my presence, and it is both accurate and complete. October 10, 2024 09:45 EDT      Dictated Utilizing Dragon Dictation: Part of this note may be an electronic transcription/translation of spoken language to printed text using the Dragon Dictation System.

## 2025-01-28 ENCOUNTER — OFFICE VISIT (OUTPATIENT)
Dept: CARDIOLOGY | Facility: CLINIC | Age: 70
End: 2025-01-28
Payer: MEDICARE

## 2025-01-28 VITALS
DIASTOLIC BLOOD PRESSURE: 78 MMHG | HEART RATE: 54 BPM | HEIGHT: 70 IN | BODY MASS INDEX: 28.89 KG/M2 | OXYGEN SATURATION: 98 % | SYSTOLIC BLOOD PRESSURE: 123 MMHG | WEIGHT: 201.8 LBS

## 2025-01-28 DIAGNOSIS — R00.2 PALPITATIONS: ICD-10-CM

## 2025-01-28 DIAGNOSIS — I45.10 RBBB: Primary | ICD-10-CM

## 2025-01-28 DIAGNOSIS — R06.09 DYSPNEA ON EXERTION: ICD-10-CM

## 2025-01-28 DIAGNOSIS — R55 SYNCOPE AND COLLAPSE: ICD-10-CM

## 2025-01-28 DIAGNOSIS — R07.2 PRECORDIAL PAIN: ICD-10-CM

## 2025-01-28 DIAGNOSIS — E78.2 MIXED HYPERLIPIDEMIA: ICD-10-CM

## 2025-01-28 DIAGNOSIS — G47.33 OSA ON CPAP: ICD-10-CM

## 2025-01-28 PROCEDURE — 99213 OFFICE O/P EST LOW 20 MIN: CPT | Performed by: INTERNAL MEDICINE

## 2025-01-28 NOTE — PROGRESS NOTES
"Subjective   Phill Madison is a 69 y.o. male     Chief Complaint   Patient presents with    Follow-up     Here for 3 mo. F/u    Hyperlipidemia    Palpitations    Shortness of Breath    Sleep Apnea    Syncope       PROBLEM LIST:     1. Hyperlipidemia  2. VALLE  3. Palpitations  3.1 Event monitor, 3- - 4-9-2022, sinus with sustained runs SVT freq. > 1 min with rate to 165, occas. PVC'S, occas. NSVT. TX'D with Flec. Referred to EP  4. Syncope and collapse, last episode approx. 2 yrs. Ago  4.1 Tlit Table, 8-1-17, pos. For neurocardiogenic syncope  5. Asthma 25 yrs. ago, reports cured with acupuncture  6. RBBB per EKG  7. CODY, uses CPAP    Specialty Problems          Cardiology Problems    Mixed hyperlipidemia        Palpitations        RBBB             HPI:  Mr. Madison returns for follow-up.    He never started SSRI as recommended.  Despite the fact that we had an extensive discussion at the time of his last visit about using SSRI to treat POTS and neurocardiogenic syncope, he never filled the prescription for that medication.  Apparently he was told by a pharmacist that SSRIs were used for depression.  He stated that he has not been depressed and actually told the intake nurse that \"Dr. Davidson misdiagnosed me\".    We discussed again today treatment options for the patient's altered autonomic function.  Despite a detailed explanation in terms that any adults should comprehend, Mr. Madison stated he was not depressed and would not use that medication.  This lends to  concern regarding his cognitive function.    The patient states that he feels better when he is physically active 4 to 5 hours a day.  He wishes to pursue that for symptom movement.  At the end of the interview today the patient states he would consider our recommendations for therapy if his symptoms are worsened are not adequately controlled by increased physical activity.                    PRIOR MEDICATIONS    Current Outpatient Medications on File Prior " to Visit   Medication Sig Dispense Refill    aspirin 81 MG EC tablet Take 1 tablet by mouth Daily. Only takes approx. 1 x week (Patient taking differently: Take 1 tablet by mouth Daily.)      Tadalafil (CIALIS PO) Take  by mouth. Takes prn for prostate      [DISCONTINUED] escitalopram (Lexapro) 5 MG tablet Take 1 tablet by mouth Daily. 30 tablet 5     No current facility-administered medications on file prior to visit.       ALLERGIES:    Sulfa antibiotics    PAST MEDICAL HISTORY:    Past Medical History:   Diagnosis Date    Allergic     Arthritis     Asthma     VALLE (dyspnea on exertion)     Hyperlipidemia     Palpitation     Syncope and collapse        SURGICAL HISTORY:    Past Surgical History:   Procedure Laterality Date    COLONOSCOPY      FOOT SURGERY      ROTATOR CUFF REPAIR      x 2       SOCIAL HISTORY:    Social History     Socioeconomic History    Marital status:    Tobacco Use    Smoking status: Never    Smokeless tobacco: Never   Vaping Use    Vaping status: Never Used   Substance and Sexual Activity    Alcohol use: No    Drug use: No    Sexual activity: Defer       FAMILY HISTORY:    Family History   Problem Relation Age of Onset    Heart disease Father     Diabetes Father     Heart disease Mother        Review of Systems   Constitutional: Negative.    HENT: Negative.     Eyes:  Positive for visual disturbance (glasses prn).   Respiratory:  Positive for shortness of breath.    Cardiovascular: Negative.    Gastrointestinal: Negative.    Endocrine: Negative.    Genitourinary: Negative.    Musculoskeletal:  Positive for arthralgias and myalgias.   Skin: Negative.    Allergic/Immunologic: Negative.    Neurological:  Positive for dizziness and light-headedness. Negative for syncope.   Hematological: Negative.    Psychiatric/Behavioral: Negative.         VISIT VITALS:  Vitals:    01/28/25 1534   BP: 123/78   BP Location: Left arm   Patient Position: Sitting   Pulse: 54   SpO2: 98%   Weight: 91.5 kg  "(201 lb 12.8 oz)   Height: 177.8 cm (70\")      /78 (BP Location: Left arm, Patient Position: Sitting)   Pulse 54   Ht 177.8 cm (70\")   Wt 91.5 kg (201 lb 12.8 oz)   SpO2 98%   BMI 28.96 kg/m²     RECENT LABS:    Objective       Physical Exam    Procedures      Assessment & Plan   #1.  Neurocardiogenic syncope/POTS.  See discussion under history of present illness above.  The patient's treatment is self-directed.    2.  Atrial fibrillation.  We reviewed multiple prior event monitors.  There was 1 tracing that was suggestive of atrial flutter but not conclusively diagnostic of atrial fibrillation.  With a [id number] 2 score of 1 for age we will continue antiplatelet therapy and the patient will report any symptoms compatible with a or stroke to Dr. Rodriguez.    3.  Mr. Madison will follow with Dr. Tejada as instructed we will plan on seeing him in follow-up in 1 year or on an as needed basis as discussed.   Diagnosis Plan   1. RBBB        2. Precordial pain        3. Palpitations        4. Mixed hyperlipidemia        5. Dyspnea on exertion        6. CODY on CPAP        7. Syncope and collapse            No follow-ups on file.         Phill Madison  reports that he has never smoked. He has never used smokeless tobacco. I have educated him on the risk of diseases from using tobacco products such as cancer, COPD, and heart disease.          Advance Care Planning   ACP discussion was held with the patient during this visit. Patient has an advance directive (not in EMR), copy requested.                         Electronically signed by:    Scribed for Seth Davidson MD by Heidy Lester LPN on January 28, 2025  at 15:38 EST    I, Seth Davidson MD personally performed the services described in this documentation as scribed by the above named individual in my presence, and it is both accurate and complete. January 28, 2025 15:38 EST      Dictated Utilizing Dragon Dictation: Part of this note may be an electronic " transcription/translation of spoken language to printed text using the Dragon Dictation System.

## 2025-03-25 ENCOUNTER — TELEPHONE (OUTPATIENT)
Dept: CARDIOLOGY | Facility: CLINIC | Age: 70
End: 2025-03-25
Payer: MEDICARE

## 2025-03-25 RX ORDER — TADALAFIL 20 MG/1
20 TABLET ORAL DAILY
Qty: 90 TABLET | Refills: 0 | Status: SHIPPED | OUTPATIENT
Start: 2025-03-25

## 2025-03-25 NOTE — TELEPHONE ENCOUNTER
The patient is requesting a refill on cialis 20mg take 1/2-1 tablet daily as directed, we have never filled this medication. Please advise.

## 2025-03-25 NOTE — TELEPHONE ENCOUNTER
Seth Davidson MD to Me (Selected Message)    3/25/25  3:51 PM  Refill Cialis 20 mg 1/2-1 p.o. as directed #90

## 2025-03-26 ENCOUNTER — TELEPHONE (OUTPATIENT)
Dept: CARDIOLOGY | Facility: CLINIC | Age: 70
End: 2025-03-26
Payer: MEDICARE

## 2025-03-26 NOTE — TELEPHONE ENCOUNTER
Patient called stating he would like to start the medication recommended by Dr Davidson to keep his heart young, something other than Flecainide. He states this was discussed a few visits back. He is not having any problems other than occasional shortness of breath. He denies chest pain and palpitations.       Per 10/10/24 visit...  2. SVT. 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 [id number] 2 score remains 1 for age. The patient was instructed to take aspirin on a daily basis. If her PT directed at neurocardiogenic syncope is unsuccessful or not tolerated we would need to consider antidysrhythmic therapy other than flecainide.

## 2025-04-01 ENCOUNTER — OFFICE VISIT (OUTPATIENT)
Dept: CARDIOLOGY | Facility: CLINIC | Age: 70
End: 2025-04-01
Payer: MEDICARE

## 2025-04-01 VITALS
WEIGHT: 209.6 LBS | DIASTOLIC BLOOD PRESSURE: 71 MMHG | SYSTOLIC BLOOD PRESSURE: 136 MMHG | OXYGEN SATURATION: 97 % | BODY MASS INDEX: 30.01 KG/M2 | HEART RATE: 57 BPM | HEIGHT: 70 IN

## 2025-04-01 DIAGNOSIS — R06.09 DYSPNEA ON EXERTION: Primary | ICD-10-CM

## 2025-04-01 DIAGNOSIS — R00.2 PALPITATIONS: ICD-10-CM

## 2025-04-01 DIAGNOSIS — R00.1 SYMPTOMATIC BRADYCARDIA: ICD-10-CM

## 2025-04-01 DIAGNOSIS — R42 DIZZINESS: ICD-10-CM

## 2025-04-01 DIAGNOSIS — R55 SYNCOPE AND COLLAPSE: ICD-10-CM

## 2025-04-01 DIAGNOSIS — E78.2 MIXED HYPERLIPIDEMIA: ICD-10-CM

## 2025-04-01 DIAGNOSIS — R07.2 PRECORDIAL PAIN: ICD-10-CM

## 2025-04-01 DIAGNOSIS — G47.33 OSA ON CPAP: ICD-10-CM

## 2025-04-01 DIAGNOSIS — I45.10 RBBB: ICD-10-CM

## 2025-04-01 DIAGNOSIS — I49.5 TACHY-BRADY SYNDROME: ICD-10-CM

## 2025-04-01 RX ORDER — PREDNISOLONE ACETATE 10 MG/ML
SUSPENSION/ DROPS OPHTHALMIC
COMMUNITY
Start: 2025-02-26

## 2025-04-01 NOTE — PROGRESS NOTES
"Subjective   Phill Madison is a 69 y.o. male     Chief Complaint   Patient presents with    Follow-up     Here to discuss meds and issues with dizziness and weakness    Dizziness    Weakness - Generalized       PROBLEM LIST:     1. Hyperlipidemia  2. VALLE  3. Palpitations  3.1 Event monitor, 3- - 4-9-2022, sinus with sustained runs SVT freq. > 1 min with rate to 165, occas. PVC'S, occas. NSVT. TX'D with Flec. Referred to EP  4. Syncope and collapse, last episode approx. 2 yrs. Ago  4.1 Tlit Table, 8-1-17, pos. For neurocardiogenic syncope  5. Asthma 25 yrs. ago, reports cured with acupuncture  6. RBBB per EKG  7. CODY, uses CPAP    Specialty Problems          Cardiology Problems    Mixed hyperlipidemia        Palpitations        RBBB             HPI:    Mr. Madison presents today because of worsening symptoms.    Over the last week he describes increased dizziness and \"coley out\".  He had 1 episode in particular approximately a week ago when he was mowing his lawn.  He tried to get off his tractor but had to sit back down because of severe dizziness, presyncope, and tunneling of his vision.    The patient states that he is lightheaded and dizzy while seated in a chair in the exam room.  EKG demonstrated profound sinus bradycardia at 45 bpm with a normal axis and right bundle branch block.  There was a minor intra-atrial conduction delay.                  PRIOR MEDICATIONS    Current Outpatient Medications on File Prior to Visit   Medication Sig Dispense Refill    aspirin 81 MG EC tablet Take 1 tablet by mouth Daily. Only takes approx. 1 x week (Patient taking differently: Take 1 tablet by mouth Daily.)      prednisoLONE acetate (PRED FORTE) 1 % ophthalmic suspension   AFTER SURGERY INSTILL ONE DROP EVERY 2 HOURS TIL BED, DAY TWO USE ONE DROP FOUR TIMES DAILY FOR 14 DAYS THEN INSTILL ONE DROP TWICE DAILY FOR 14 DAYS      tadalafil (Cialis) 20 MG tablet Take 1 tablet by mouth Daily. Take 1/2-1 tablet daily as " "directed (Patient taking differently: Take 30 mg by mouth. Every 8-10 days) 90 tablet 0     No current facility-administered medications on file prior to visit.       ALLERGIES:    Sulfa antibiotics    PAST MEDICAL HISTORY:    Past Medical History:   Diagnosis Date    Allergic     Arthritis     Asthma     VALLE (dyspnea on exertion)     Hyperlipidemia     Palpitation     Syncope and collapse        SURGICAL HISTORY:    Past Surgical History:   Procedure Laterality Date    COLONOSCOPY      FOOT SURGERY      ROTATOR CUFF REPAIR      x 2       SOCIAL HISTORY:    Social History     Socioeconomic History    Marital status:    Tobacco Use    Smoking status: Never    Smokeless tobacco: Never   Vaping Use    Vaping status: Never Used   Substance and Sexual Activity    Alcohol use: No    Drug use: No    Sexual activity: Defer       FAMILY HISTORY:    Family History   Problem Relation Age of Onset    Heart disease Father     Diabetes Father     Heart disease Mother        Review of Systems   Constitutional:  Positive for fatigue.   HENT: Negative.     Eyes:  Positive for visual disturbance (glasses prn).   Respiratory:  Positive for shortness of breath.    Cardiovascular: Negative.    Gastrointestinal: Negative.    Endocrine: Negative.    Genitourinary: Negative.    Musculoskeletal: Negative.    Skin: Negative.    Allergic/Immunologic: Negative.    Neurological:  Positive for dizziness and light-headedness. Negative for syncope.   Hematological: Negative.    Psychiatric/Behavioral: Negative.         VISIT VITALS:  Vitals:    04/01/25 1213   BP: 136/71   BP Location: Left arm   Patient Position: Sitting   Pulse: 57   SpO2: 97%   Weight: 95.1 kg (209 lb 9.6 oz)   Height: 177.8 cm (70\")      /71 (BP Location: Left arm, Patient Position: Sitting)   Pulse 57   Ht 177.8 cm (70\")   Wt 95.1 kg (209 lb 9.6 oz)   SpO2 97%   BMI 30.07 kg/m²     RECENT LABS:    Objective       Physical Exam      ECG 12 Lead    Date/Time: " 4/1/2025 12:23 PM  Performed by: Seth Davidson MD    Authorized by: Seth Davidson MD  Comparison: compared with previous ECG from 10/10/2024  Comments: Phone sinus bradycardia at 45 bpm with right bundle branch block.  Minor nonspecific intra-atrial conduction delay.  Rate has increased from prior otherwise no change.            Assessment & Plan   #1.  Previously diagnosed tachybradycardia syndrome with profound bradycardia at night but no convincing association of bradycardia with symptoms until today.  Currently, with an adequate blood pressure, heart rate of 45, and symptoms of weakness lightheadedness and dizziness I feel the patient has an indication for pacemaker implantation for symptomatic bradycardia.  He is on no negative chronotropic or dromotropic medications.  We will make arrangements for pacemaker implantation as soon as possible.  The patient was given general instructions to avoid any circumstances were partial or complete loss of consciousness could endanger himself or others.  We will plan on seeing him in follow-up after device implantation.  The patient would prefer to have that procedure done here in [place] we will make arrangements with Dr. Balderrama in that regard.   Diagnosis Plan   1. Dyspnea on exertion  ECG 12 Lead      2. Mixed hyperlipidemia        3. Palpitations        4. Precordial pain        5. RBBB        6. CODY on CPAP        7. Syncope and collapse        8. Dizziness  ECG 12 Lead      9. Tachy-tyrone syndrome        10. Symptomatic bradycardia            No follow-ups on file.         Phill Madison  reports that he has never smoked. He has never used smokeless tobacco. I have educated him on the risk of diseases from using tobacco products such as cancer, COPD, and heart disease.       Advance Care Planning   ACP discussion was held with the patient during this visit. Patient has an advance directive (not in EMR), copy requested.                             Electronically signed by:    Scribed for Seth Davidson MD by Heidy Lester LPN on April 1, 2025  at 12:46 EDT    I, Seth Davidson MD personally performed the services described in this documentation as scribed by the above named individual in my presence, and it is both accurate and complete. April 1, 2025 12:46 EDT      Dictated Utilizing Dragon Dictation: Part of this note may be an electronic transcription/translation of spoken language to printed text using the Dragon Dictation System.

## 2025-04-28 ENCOUNTER — TELEPHONE (OUTPATIENT)
Dept: CARDIOLOGY | Facility: CLINIC | Age: 70
End: 2025-04-28

## 2025-04-28 DIAGNOSIS — I47.10 SVT (SUPRAVENTRICULAR TACHYCARDIA): ICD-10-CM

## 2025-04-28 DIAGNOSIS — R55 SYNCOPE AND COLLAPSE: ICD-10-CM

## 2025-04-28 NOTE — TELEPHONE ENCOUNTER
New implant from 04/24/2025. 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 There are also 2 recorded AMS episodes that are afib with the longest episode being 4 mins 56 seconds with a peak v-rate of 142 bpm. The majority of the recorded episodes all occur on 04/27/2025. Patient is not on ASA or anticoagulant therapy.     Called patient, no answer. Left message for return call. Report is in [facility] for review.

## 2025-04-29 ENCOUNTER — TELEPHONE (OUTPATIENT)
Dept: CARDIOLOGY | Facility: CLINIC | Age: 70
End: 2025-04-29
Payer: MEDICARE

## 2025-04-29 RX ORDER — METOPROLOL TARTRATE 25 MG/1
12.5 TABLET, FILM COATED ORAL 2 TIMES DAILY
Qty: 30 TABLET | Refills: 11 | Status: SHIPPED | OUTPATIENT
Start: 2025-04-29 | End: 2025-05-02

## 2025-04-29 RX ORDER — ASPIRIN 81 MG/1
81 TABLET ORAL DAILY
Qty: 90 TABLET | Refills: 3 | Status: SHIPPED | OUTPATIENT
Start: 2025-04-29 | End: 2025-05-02

## 2025-04-29 NOTE — TELEPHONE ENCOUNTER
Patient need to be screened for previous history of bleeding.  Patient does look like he has a new onset atrial fibrillation based on his device interrogation.  As well as several episodes of AVNRT.  I would like to start patient on metoprolol tartrate 12.5 mg twice daily.  Have him monitor his blood pressure.  Will need to get him in sooner follow-up due to new onset A-fib.  He is already been monitored by EP services in [place].  Due to a [id number]-[id number] score of 1 due to age aspirin 81 mg daily is appropriate.  We will need a sooner appointment to reevaluate and discuss this new diagnosis.

## 2025-04-29 NOTE — TELEPHONE ENCOUNTER
Pt came into the office and reported to Barbara BRAN that he was at his pharmacy and his diastolic B/P was 121 and they instructed him to go to the ED for assessment.  Pt requested that we check his B/P.  Pt B/P was assessed and it was 121/81.  Pt printed appt reminder and was provided B/P logs to track his B/P and bring readings when he returns on Friday.

## 2025-04-29 NOTE — TELEPHONE ENCOUNTER
Taya (HIPAA) notified of Ken Hernandez's recommendations. Medications sent to the pharmacy. We will need a sooner appointment to reevaluate and discuss this new diagnosis.

## 2025-04-29 NOTE — TELEPHONE ENCOUNTER
"Patient returned call. Patient reports he went for a walk Sunday night. He reports he did have to stop and, \"catch his breath once or twice.\" He denies  cp, palpitations or edema. He reports weakness on Sunday and Monday. Reports he feels well today. Spoke with him and his wife, explained findings on pacemaker and briefly reviewed recommendations. I alerted them that someone from the office will be contacting them with further instructions and an appointment. He is currently scheduled a wound check on 05/02/2025. They are inquiring if he can be seen at the same time.    Received a transmission today that revealed 3 HVR episodes on 04/28/2025. They are AVNRT with the longest being 2 hours 39 minutes at a rate of 159 bpm.  Patient reports he was sitting watching some men lay bricks. He reports weakness and some shortness of breath during this time period.    Report is in [facility] for review.         "

## 2025-05-02 ENCOUNTER — TELEPHONE (OUTPATIENT)
Dept: CARDIOLOGY | Facility: CLINIC | Age: 70
End: 2025-05-02

## 2025-05-02 ENCOUNTER — OFFICE VISIT (OUTPATIENT)
Dept: CARDIOLOGY | Facility: CLINIC | Age: 70
End: 2025-05-02
Payer: MEDICARE

## 2025-05-02 VITALS
HEART RATE: 61 BPM | BODY MASS INDEX: 30.06 KG/M2 | DIASTOLIC BLOOD PRESSURE: 79 MMHG | SYSTOLIC BLOOD PRESSURE: 115 MMHG | WEIGHT: 210 LBS | HEIGHT: 70 IN | OXYGEN SATURATION: 96 %

## 2025-05-02 DIAGNOSIS — I47.10 PAROXYSMAL SVT (SUPRAVENTRICULAR TACHYCARDIA): ICD-10-CM

## 2025-05-02 DIAGNOSIS — I45.10 RBBB: ICD-10-CM

## 2025-05-02 DIAGNOSIS — I48.0 PAROXYSMAL ATRIAL FIBRILLATION: Primary | ICD-10-CM

## 2025-05-02 DIAGNOSIS — Z95.0 PRESENCE OF CARDIAC PACEMAKER: ICD-10-CM

## 2025-05-02 DIAGNOSIS — R00.2 PALPITATIONS: ICD-10-CM

## 2025-05-02 DIAGNOSIS — G47.33 OSA ON CPAP: ICD-10-CM

## 2025-05-02 DIAGNOSIS — R06.09 DYSPNEA ON EXERTION: ICD-10-CM

## 2025-05-02 DIAGNOSIS — I48.0 PAROXYSMAL ATRIAL FIBRILLATION: ICD-10-CM

## 2025-05-02 DIAGNOSIS — I47.10 SVT (SUPRAVENTRICULAR TACHYCARDIA): ICD-10-CM

## 2025-05-02 DIAGNOSIS — I47.20 VENTRICULAR TACHYCARDIA (PAROXYSMAL): Primary | ICD-10-CM

## 2025-05-02 DIAGNOSIS — E78.2 MIXED HYPERLIPIDEMIA: ICD-10-CM

## 2025-05-02 PROCEDURE — 99214 OFFICE O/P EST MOD 30 MIN: CPT | Performed by: SPECIALIST

## 2025-05-02 RX ORDER — METOPROLOL TARTRATE 25 MG/1
25 TABLET, FILM COATED ORAL 2 TIMES DAILY
Qty: 180 TABLET | Refills: 1 | Status: SHIPPED | OUTPATIENT
Start: 2025-05-02 | End: 2025-05-02 | Stop reason: SDUPTHER

## 2025-05-02 RX ORDER — METOPROLOL TARTRATE 25 MG/1
25 TABLET, FILM COATED ORAL 2 TIMES DAILY
Qty: 180 TABLET | Refills: 1 | Status: SHIPPED | OUTPATIENT
Start: 2025-05-02

## 2025-05-02 NOTE — PROGRESS NOTES
Subjective   Follow up, post pacer placement, PAF,VT  Phill Madison is a 69 y.o. male who presents to day for Follow-up (Follow up for cardiac management).    CHIEF COMPLIANT  Chief Complaint   Patient presents with    Follow-up     Follow up for cardiac management       Active Problems:    PROBLEM LIST:      1. Hyperlipidemia  2. VALLE  3. Palpitations  3.1 Event monitor, 3- - 4-9-2022, sinus with sustained runs SVT freq. > 1 min with rate to 165, occas. PVC'S, occas. NSVT. TX'D with Flec. Referred to EP  4. Syncope and collapse, last episode approx. 2 yrs. Ago  4.1 Tlit Table, 8-1-17, pos. For neurocardiogenic syncope  5. Asthma 25 yrs. ago, reports cured with acupuncture  6. RBBB per EKG  7. CODY, uses CPAP    Problem List Items Addressed This Visit          Cardiac and Vasculature    Mixed hyperlipidemia    Dyspnea on exertion    Paroxysmal atrial fibrillation - Primary    Relevant Medications    apixaban (ELIQUIS) 5 MG tablet tablet    metoprolol tartrate (LOPRESSOR) 25 MG tablet    Paroxysmal SVT (supraventricular tachycardia)    Relevant Medications    metoprolol tartrate (LOPRESSOR) 25 MG tablet    Presence of cardiac pacemaker       Sleep    CODY on CPAP       HPI  HPI    History of Present Illness       Patient post pacemaker implantation back on 24 April interrogation showed frequent runs of atrial fibrillation and SVT likely AV lizbeth reentry, he is actually asymptomatic regarding both where he does not feel the palpitations are still but sometimes he gets slightly dizzy no significant chest pain no shortness of breath no edema, he has been using CPAP every night  PRIOR MEDS  Current Outpatient Medications on File Prior to Visit   Medication Sig Dispense Refill    tadalafil (Cialis) 20 MG tablet Take 1 tablet by mouth Daily. Take 1/2-1 tablet daily as directed (Patient taking differently: Take 30 mg by mouth. Every 8-10 days) 90 tablet 0    [DISCONTINUED] aspirin 81 MG EC tablet Take 1 tablet by mouth  Daily. 90 tablet 3    [DISCONTINUED] metoprolol tartrate (LOPRESSOR) 25 MG tablet Take 0.5 tablets by mouth 2 (Two) Times a Day. 30 tablet 11    prednisoLONE acetate (PRED FORTE) 1 % ophthalmic suspension   AFTER SURGERY INSTILL ONE DROP EVERY 2 HOURS TIL BED, DAY TWO USE ONE DROP FOUR TIMES DAILY FOR 14 DAYS THEN INSTILL ONE DROP TWICE DAILY FOR 14 DAYS (Patient not taking: Reported on 5/2/2025)       No current facility-administered medications on file prior to visit.       ALLERGIES  Sulfa antibiotics    HISTORY  Past Medical History:   Diagnosis Date    Allergic     Arthritis     Asthma     VALLE (dyspnea on exertion)     Hyperlipidemia     Palpitation     Syncope and collapse        Social History     Socioeconomic History    Marital status:    Tobacco Use    Smoking status: Never    Smokeless tobacco: Never   Vaping Use    Vaping status: Never Used   Substance and Sexual Activity    Alcohol use: No    Drug use: No    Sexual activity: Defer       Family History   Problem Relation Age of Onset    Heart disease Father     Diabetes Father     Heart disease Mother        Review of Systems   Constitutional:  Negative for appetite change and unexpected weight change.   HENT:  Negative for ear discharge, ear pain, facial swelling, sore throat and trouble swallowing.    Eyes:  Negative for pain, redness, itching and visual disturbance.   Respiratory:  Negative for apnea, cough, choking, chest tightness, shortness of breath and wheezing.    Cardiovascular:  Negative for chest pain, palpitations and leg swelling.   Gastrointestinal:  Negative for abdominal pain, constipation and vomiting.   Genitourinary:  Negative for difficulty urinating, frequency and urgency.   Musculoskeletal:  Negative for back pain, neck pain and neck stiffness.   Skin:  Negative for color change, rash and wound.   Allergic/Immunologic: Negative for food allergies.   Neurological:  Positive for dizziness, light-headedness and headaches.  "Negative for tremors, seizures, weakness and numbness.   Hematological:  Does not bruise/bleed easily.   Psychiatric/Behavioral:  Negative for behavioral problems, hallucinations and suicidal ideas. The patient is not nervous/anxious.        Objective     VITALS: /79 (BP Location: Left arm, Patient Position: Sitting, Cuff Size: Adult)   Pulse 61   Ht 177.8 cm (70\")   Wt 95.3 kg (210 lb)   SpO2 96%   BMI 30.13 kg/m²     LABS:   Lab Results (most recent)       None            IMAGING:   No Images in the past 120 days found..    EXAM:  Physical Exam  Constitutional:       Appearance: He is well-developed.   HENT:      Head: Normocephalic and atraumatic.   Eyes:      Pupils: Pupils are equal, round, and reactive to light.   Neck:      Thyroid: No thyromegaly.      Vascular: No JVD.   Cardiovascular:      Rate and Rhythm: Normal rate and regular rhythm.      Chest Wall: PMI is not displaced.      Pulses: Normal pulses.      Heart sounds: Normal heart sounds, S1 normal and S2 normal. No murmur heard.     No friction rub. No gallop.      Comments: Healing scar, Pacer pocket is non tender  Pulmonary:      Effort: Pulmonary effort is normal. No respiratory distress.      Breath sounds: Normal breath sounds. No stridor. No wheezing or rales.   Chest:      Chest wall: No tenderness.   Abdominal:      General: Bowel sounds are normal. There is no distension.      Palpations: Abdomen is soft. There is no mass.      Tenderness: There is no abdominal tenderness. There is no guarding or rebound.   Musculoskeletal:      Cervical back: Neck supple. No edema.   Skin:     General: Skin is warm and dry.      Coloration: Skin is not pale.      Findings: No erythema or rash.   Neurological:      Mental Status: He is alert and oriented to person, place, and time.      Cranial Nerves: No cranial nerve deficit.      Coordination: Coordination normal.   Psychiatric:         Behavior: Behavior normal.       Physical Exam     "     Procedure   Procedures      Results         Assessment & Plan     Diagnoses and all orders for this visit:    1. Paroxysmal atrial fibrillation (Primary)  -     apixaban (ELIQUIS) 5 MG tablet tablet; Take 1 tablet by mouth 2 (Two) Times a Day.  Dispense: 180 tablet; Refill: 1  -     metoprolol tartrate (LOPRESSOR) 25 MG tablet; Take 1 tablet by mouth 2 (Two) Times a Day.  Dispense: 180 tablet; Refill: 1    2. Paroxysmal SVT (supraventricular tachycardia)  -     metoprolol tartrate (LOPRESSOR) 25 MG tablet; Take 1 tablet by mouth 2 (Two) Times a Day.  Dispense: 180 tablet; Refill: 1    3. Dyspnea on exertion    4. Mixed hyperlipidemia    5. Presence of cardiac pacemaker    6. CODY on CPAP    Other orders  -     Discontinue: metoprolol tartrate (LOPRESSOR) 25 MG tablet; Take 1 tablet by mouth 2 (Two) Times a Day.  Dispense: 180 tablet; Refill: 1    1.  Patient has runs of SVTs and atrial fibrillation however he is mostly asymptomatic with these runs we had long discussion about the management of both including also consideration of ablation and medications we talked also about antiarrhythmic occasions and rate control, and also stroke prevention still in the end the plan as we start the patient on metoprolol 25 mg twice daily and Eliquis 5 mg twice daily we discussed about starting him on flecainide as no significant structural heart disease or ischemia but he wants to wait before starting the flecainide so we will start the metoprolol and Eliquis and will get an event monitor just before his next visit and if he is having if he has high burden of atrial fibrillation we will consider adding flecainide and of course we will also discuss the possible ablation procedure or if all of the above do not work  2.  Pacemaker is implanted the wound looks clean we will continue monitoring  3.  He has been using CPAP religiously continue with well  4.  He is denying any shortness of breath  5.  Patient will also VT runs we  will proceed with stress test and echo for further assessment  Assessment & Plan         Return in about 4 weeks (around 5/30/2025).      Advance Care Planning   ACP discussion was held with the patient during this visit. Patient has an advance directive (not in EMR), copy requested.             MEDS ORDERED DURING VISIT:  New Medications Ordered This Visit   Medications    apixaban (ELIQUIS) 5 MG tablet tablet     Sig: Take 1 tablet by mouth 2 (Two) Times a Day.     Dispense:  180 tablet     Refill:  1    metoprolol tartrate (LOPRESSOR) 25 MG tablet     Sig: Take 1 tablet by mouth 2 (Two) Times a Day.     Dispense:  180 tablet     Refill:  1         As always, Min Kelly MD  I appreciate very much the opportunity to participate in the cardiovascular care of your patients. Please do not hesitate to call me with any questions with regards to Phill Madison evaluation and management.     Patient or patient representative verbalized consent for the use of Ambient Listening during the visit with  Vi Cadet MD for chart documentation. 5/2/2025  10:38 EDT       This document has been electronically signed by Vi Cadet MD  May 2, 2025 11:33 EDT    This note is dictated utilizing voice recognition software.

## 2025-05-02 NOTE — TELEPHONE ENCOUNTER
Reviewed remote transmission revealing 2 HVR rate episodes consistent with VT longest lasting 1 hour and 5 mins on 8/1/25. Rate 160 bpm  Confirmed  episode of VT with [facility] rep. Patient was seen in office this morning for SVT/ AF, and prescribed Eliquis and metoprolol. See below for screenshots of episode and full report in [facility] for Dr Davidson.

## 2025-05-05 ENCOUNTER — TELEPHONE (OUTPATIENT)
Dept: CARDIOLOGY | Facility: CLINIC | Age: 70
End: 2025-05-05
Payer: MEDICARE

## 2025-05-05 DIAGNOSIS — I47.10 PAROXYSMAL SVT (SUPRAVENTRICULAR TACHYCARDIA): ICD-10-CM

## 2025-05-05 DIAGNOSIS — I48.0 PAROXYSMAL ATRIAL FIBRILLATION: ICD-10-CM

## 2025-05-05 NOTE — TELEPHONE ENCOUNTER
Hub staff attempted to follow warm transfer process and was unsuccessful     Caller: Taya Madison    Relationship to patient: Emergency Contact    Best call back number: [phone]     Patient is needing: HAD HEART IMPLANT, WAS RX metoprolol tartrate (LOPRESSOR) AS WELL AS ASPRIN. NO LONGER TAKING THE ASPRIN, IS NOW TAKING ELIQUIS 5MG. PT IS TAKING 25MG TWICE A DAY, SOMETIMES THIS TAKES 50MG TO BRING THIS DOWN. ASKING FOR A NEW RX OF THE 50 MG SENT [facility]     metoprolol tartrate (LOPRESSOR) 25 MG tablet [[id number]] (Order [id number])  Order, STATES THIS NEEDS TO BE 50MG TWICE A DAY. WILL BE OUT IN 2 WEEKS.     153/91 HR 61 - ACTIVE NOW    BP has BEEN erratic, UNSURE HOW LONG.  HAS MULTIPLE DIFFERENT READINGS FROM TODAY WHERE THEY ARE DIFFERENT     AFTER CALLING OFFICE, UNABLE TO WT. REPORTS READING 133/82 HR 60 AFTER TAKING MEDS.     PT IS NEEDING A 2 WEEK F/U PER CALLER, MIDDLE OF MAY

## 2025-05-05 NOTE — TELEPHONE ENCOUNTER
Relay     Called and left voicemail with new appointment time and date with  on 5/20/2025 at 3:30.

## 2025-05-05 NOTE — TELEPHONE ENCOUNTER
Provided the pt's wife (who is on his HIPAA) the appt date and time.      She reports the pt's heart rate has been elevated and B/P elevated.  She gave the pt 50 mg Metoprolol this morning and it brought his HR and B/P both down to normal.  She inquired if the Metoprolol can be increased to 50 mg BID or if there is a PRN medication that the pt can be prescribed.      Transferred pt to [facility] in [facility] to schedule the pt's urgent stress and echo appt.

## 2025-05-05 NOTE — TELEPHONE ENCOUNTER
The patient has been taking 25mg of metoprolol twice daily and today he took 50 mg due to increased blood pressure earlier today. Blood pressure has been very normal but at one point today it was 106/52 hr 133, 124/85 hr 136, 115/74 hr 113, 116/87 hr 71, 153/91 hr 60. He thought the pace maker was supposed to control the heart rate. No chest pain, no palpitations, al little bit of dizziness, some headache, a little bit of sob. The patient needs an appointment, in two weeks. Patient has not heard anything on the stress/echo tests.

## 2025-05-06 RX ORDER — METOPROLOL TARTRATE 50 MG
50 TABLET ORAL 2 TIMES DAILY
Qty: 180 TABLET | Refills: 1 | Status: SHIPPED | OUTPATIENT
Start: 2025-05-06

## 2025-05-06 NOTE — TELEPHONE ENCOUNTER
Vi Cadet MD to Me         5/6/25 12:34 PM  We can increase the metoprolol to 50 mg twice daily not as required but continuously, thank you      Called and notified patient to begin taking Metoprolol dose 50 mg BID.  If you can please send in an RX to the medicine shop in [place],  he will take adjusted dose with what he has now but will run out sooner and need the adjusted dose sent to pharmacy.  Thanks.

## 2025-05-07 ENCOUNTER — HOSPITAL ENCOUNTER (OUTPATIENT)
Dept: CARDIOLOGY | Facility: HOSPITAL | Age: 70
End: 2025-05-07
Payer: MEDICARE

## 2025-05-07 ENCOUNTER — HOSPITAL ENCOUNTER (OUTPATIENT)
Dept: CARDIOLOGY | Facility: HOSPITAL | Age: 70
Discharge: HOME | End: 2025-05-07
Payer: MEDICARE

## 2025-05-07 DIAGNOSIS — I45.10 RBBB: ICD-10-CM

## 2025-05-07 DIAGNOSIS — R06.09 DYSPNEA ON EXERTION: ICD-10-CM

## 2025-05-07 DIAGNOSIS — I47.10 SVT (SUPRAVENTRICULAR TACHYCARDIA): ICD-10-CM

## 2025-05-07 DIAGNOSIS — Z95.0 PRESENCE OF CARDIAC PACEMAKER: ICD-10-CM

## 2025-05-07 DIAGNOSIS — I47.10 PAROXYSMAL SVT (SUPRAVENTRICULAR TACHYCARDIA): ICD-10-CM

## 2025-05-07 DIAGNOSIS — I47.20 VENTRICULAR TACHYCARDIA (PAROXYSMAL): ICD-10-CM

## 2025-05-07 DIAGNOSIS — R00.2 PALPITATIONS: ICD-10-CM

## 2025-05-07 DIAGNOSIS — I48.0 PAROXYSMAL ATRIAL FIBRILLATION: ICD-10-CM

## 2025-05-07 LAB
AV MEAN PRESS GRAD SYS DOP V1V2: 3 MMHG
AV VMAX SYS DOP: 114 CM/SEC
BH CV ECHO MEAS - ACS: 2.5 CM
BH CV ECHO MEAS - AO MAX PG: 5.2 MMHG
BH CV ECHO MEAS - AO ROOT DIAM: 3.3 CM
BH CV ECHO MEAS - AO V2 VTI: 23.5 CM
BH CV ECHO MEAS - EDV(CUBED): 89.9 ML
BH CV ECHO MEAS - EDV(MOD-SP4): 112 ML
BH CV ECHO MEAS - EF(MOD-SP4): 50 %
BH CV ECHO MEAS - ESV(CUBED): 20.3 ML
BH CV ECHO MEAS - ESV(MOD-SP4): 56 ML
BH CV ECHO MEAS - FS: 39.1 %
BH CV ECHO MEAS - IVS/LVPW: 1.58 CM
BH CV ECHO MEAS - IVSD: 1.4 CM
BH CV ECHO MEAS - LA DIMENSION: 4.4 CM
BH CV ECHO MEAS - LAT PEAK E' VEL: 10.4 CM/SEC
BH CV ECHO MEAS - LV DIASTOLIC VOL/BSA (35-75): 52.6 CM2
BH CV ECHO MEAS - LV MASS(C)D: 261.6 GRAMS
BH CV ECHO MEAS - LV SYSTOLIC VOL/BSA (12-30): 26.3 CM2
BH CV ECHO MEAS - LVIDD: 4.5 CM
BH CV ECHO MEAS - LVIDS: 2.7 CM
BH CV ECHO MEAS - LVPWD: 1.13 CM
BH CV ECHO MEAS - MED PEAK E' VEL: 5.4 CM/SEC
BH CV ECHO MEAS - MV A MAX VEL: 39.8 CM/SEC
BH CV ECHO MEAS - MV DEC TIME: 0.27 SEC
BH CV ECHO MEAS - MV E MAX VEL: 52.3 CM/SEC
BH CV ECHO MEAS - MV E/A: 1.31
BH CV ECHO MEAS - RAP SYSTOLE: 10 MMHG
BH CV ECHO MEAS - RVDD: 4 CM
BH CV ECHO MEAS - RVSP: 24 MMHG
BH CV ECHO MEAS - SV(MOD-SP4): 56 ML
BH CV ECHO MEAS - SVI(MOD-SP4): 26.3 ML/M2
BH CV ECHO MEAS - TR MAX PG: 14 MMHG
BH CV ECHO MEAS - TR MAX VEL: 187.3 CM/SEC
BH CV ECHO MEASUREMENTS AVERAGE E/E' RATIO: 6.62
IVRT: 123 MS
LEFT ATRIUM VOLUME INDEX: 23.7 ML/M2
LV EF 3D SEGMENTATION: 58 %

## 2025-05-07 PROCEDURE — 34310000005 TECHNETIUM SESTAMIBI: Performed by: SPECIALIST

## 2025-05-07 PROCEDURE — 93306 TTE W/DOPPLER COMPLETE: CPT

## 2025-05-07 PROCEDURE — A9500 TC99M SESTAMIBI: HCPCS | Performed by: SPECIALIST

## 2025-05-12 ENCOUNTER — TELEPHONE (OUTPATIENT)
Dept: CARDIOLOGY | Facility: CLINIC | Age: 70
End: 2025-05-12
Payer: MEDICARE

## 2025-05-12 DIAGNOSIS — I48.0 PAROXYSMAL ATRIAL FIBRILLATION: ICD-10-CM

## 2025-05-12 DIAGNOSIS — I45.10 RBBB: ICD-10-CM

## 2025-05-12 DIAGNOSIS — I47.10 PAROXYSMAL SVT (SUPRAVENTRICULAR TACHYCARDIA): ICD-10-CM

## 2025-05-12 DIAGNOSIS — Z95.0 PRESENCE OF CARDIAC PACEMAKER: ICD-10-CM

## 2025-05-12 DIAGNOSIS — R00.2 PALPITATIONS: ICD-10-CM

## 2025-05-12 DIAGNOSIS — R06.09 DYSPNEA ON EXERTION: ICD-10-CM

## 2025-05-12 DIAGNOSIS — I47.20 VENTRICULAR TACHYCARDIA (PAROXYSMAL): Primary | ICD-10-CM

## 2025-05-12 NOTE — TELEPHONE ENCOUNTER
Per Dr. Davidson due to recent arrhythmias and since PPM was placed on 4-, wants stress test changed to dobutamine stress echo. Order placed and Carine, in scheduling aware. PH,AALIYAHN

## 2025-05-15 ENCOUNTER — HOSPITAL ENCOUNTER (OUTPATIENT)
Dept: CARDIOLOGY | Facility: HOSPITAL | Age: 70
Discharge: HOME | End: 2025-05-15
Admitting: INTERNAL MEDICINE
Payer: MEDICARE

## 2025-05-15 VITALS — WEIGHT: 210.1 LBS | HEIGHT: 70 IN | BODY MASS INDEX: 30.08 KG/M2

## 2025-05-15 DIAGNOSIS — I47.20 VENTRICULAR TACHYCARDIA (PAROXYSMAL): ICD-10-CM

## 2025-05-15 DIAGNOSIS — R00.2 PALPITATIONS: ICD-10-CM

## 2025-05-15 DIAGNOSIS — I45.10 RBBB: ICD-10-CM

## 2025-05-15 DIAGNOSIS — R06.09 DYSPNEA ON EXERTION: ICD-10-CM

## 2025-05-15 DIAGNOSIS — Z95.0 PRESENCE OF CARDIAC PACEMAKER: ICD-10-CM

## 2025-05-15 DIAGNOSIS — I48.0 PAROXYSMAL ATRIAL FIBRILLATION: ICD-10-CM

## 2025-05-15 DIAGNOSIS — I47.10 PAROXYSMAL SVT (SUPRAVENTRICULAR TACHYCARDIA): ICD-10-CM

## 2025-05-15 LAB
BH CV STRESS DOSE DOBUTAMINE STAGE 1: 10
BH CV STRESS DURATION MIN STAGE 1: 2
BH CV STRESS DURATION SEC STAGE 1: 0
BH CV STRESS PROTOCOL 1: NORMAL
BH CV STRESS RECOVERY BP: NORMAL MMHG
BH CV STRESS RECOVERY HR: 77 BPM
BH CV STRESS STAGE 1: 1
MAXIMAL PREDICTED HEART RATE: 150 BPM
PERCENT MAX PREDICTED HR: 109.33 %
STRESS BASELINE BP: NORMAL MMHG
STRESS BASELINE HR: 60 BPM
STRESS PERCENT HR: 129 %
STRESS POST ESTIMATED WORKLOAD: 1 METS
STRESS POST EXERCISE DUR MIN: 11 MIN
STRESS POST EXERCISE DUR SEC: 26 SEC
STRESS POST PEAK BP: NORMAL MMHG
STRESS POST PEAK HR: 164 BPM
STRESS TARGET HR: 128 BPM

## 2025-05-15 PROCEDURE — 25010000002 DOBUTAMINE PER 250 MG: Performed by: INTERNAL MEDICINE

## 2025-05-15 PROCEDURE — 93351 STRESS TTE COMPLETE: CPT

## 2025-05-15 PROCEDURE — 93320 DOPPLER ECHO COMPLETE: CPT

## 2025-05-15 PROCEDURE — 93325 DOPPLER ECHO COLOR FLOW MAPG: CPT

## 2025-05-15 RX ORDER — DOBUTAMINE HYDROCHLORIDE 200 MG/100ML
10 INJECTION INTRAVENOUS
Status: COMPLETED | OUTPATIENT
Start: 2025-05-15 | End: 2025-05-15

## 2025-05-15 RX ADMIN — DOBUTAMINE HYDROCHLORIDE 10 MCG/KG/MIN: 200 INJECTION INTRAVENOUS at 15:25

## 2025-05-20 ENCOUNTER — OFFICE VISIT (OUTPATIENT)
Dept: CARDIOLOGY | Facility: CLINIC | Age: 70
End: 2025-05-20
Payer: MEDICARE

## 2025-05-20 VITALS
SYSTOLIC BLOOD PRESSURE: 94 MMHG | BODY MASS INDEX: 29.95 KG/M2 | HEIGHT: 70 IN | WEIGHT: 209.2 LBS | HEART RATE: 60 BPM | DIASTOLIC BLOOD PRESSURE: 63 MMHG | OXYGEN SATURATION: 96 %

## 2025-05-20 DIAGNOSIS — Z95.0 PRESENCE OF CARDIAC PACEMAKER: ICD-10-CM

## 2025-05-20 DIAGNOSIS — R00.2 PALPITATIONS: ICD-10-CM

## 2025-05-20 DIAGNOSIS — I49.5 TACHY-BRADY SYNDROME: Primary | ICD-10-CM

## 2025-05-20 DIAGNOSIS — R55 SYNCOPE AND COLLAPSE: ICD-10-CM

## 2025-05-20 DIAGNOSIS — R00.1 SYMPTOMATIC BRADYCARDIA: ICD-10-CM

## 2025-05-20 DIAGNOSIS — I45.10 RBBB: ICD-10-CM

## 2025-05-20 DIAGNOSIS — I47.10 PAROXYSMAL SVT (SUPRAVENTRICULAR TACHYCARDIA): ICD-10-CM

## 2025-05-20 DIAGNOSIS — R03.1 LOW BLOOD PRESSURE READING: ICD-10-CM

## 2025-05-20 DIAGNOSIS — E78.2 MIXED HYPERLIPIDEMIA: ICD-10-CM

## 2025-05-20 DIAGNOSIS — R07.2 PRECORDIAL PAIN: ICD-10-CM

## 2025-05-20 DIAGNOSIS — R06.09 DYSPNEA ON EXERTION: ICD-10-CM

## 2025-05-20 DIAGNOSIS — I48.0 PAROXYSMAL ATRIAL FIBRILLATION: ICD-10-CM

## 2025-05-20 DIAGNOSIS — G47.33 OSA ON CPAP: ICD-10-CM

## 2025-05-20 RX ORDER — FLECAINIDE ACETATE 50 MG/1
50 TABLET ORAL 2 TIMES DAILY
COMMUNITY

## 2025-05-20 NOTE — PROGRESS NOTES
Subjective   Phill Madison is a 69 y.o. male     Chief Complaint   Patient presents with    Follow-up     Here for f/u    Atrial Fibrillation    Rapid Heart Rate    Shortness of Breath    Palpitations    Sleep Apnea       PROBLEM LIST:     1. Hyperlipidemia  2. VALLE  3. Palpitations  3.1 Event monitor, 3- - 4-9-2022, sinus with sustained runs SVT freq. > 1 min with rate to 165, occas. PVC'S, occas. NSVT. TX'D with Flec. Referred to EP  4. Syncope and collapse, last episode approx. 2 yrs. Ago  4.1 Tlit Table, 8-1-17, pos. For neurocardiogenic syncope  4.2 Dual chamber Abbott PPM per Dr. Balderrama on 4-  5. Asthma 25 yrs. ago, reports cured with acupuncture  6. RBBB per EKG  7. CODY, uses CPAP  8. Echo, 5-7-2025. EF 61-65%, mild LVH, grade 1 DD, pulm. Pressures < 35 mmHg  9. A-fib / flutter per event monitors. On Eliquis.     Specialty Problems          Cardiology Problems    Mixed hyperlipidemia        Palpitations        RBBB        Symptomatic bradycardia        Tachy-tyrone syndrome        Paroxysmal atrial fibrillation        Paroxysmal SVT (supraventricular tachycardia)        Presence of cardiac pacemaker             HPI:  Mr. Gallardo returns for follow-up on testing.    Apparently, of his own accord, he started taking flecainide 50 mg twice daily.  He has been taking that medication for a week.  Today EKG demonstrates a dual-chamber pacing with a paced QTc of 433.  He feels no significant improvement in comparison to symptoms prior to rhythm control.  He is orthostatic and describes decreased but stable functional capacity.    Stress echo demonstrated no evidence of ischemia with preserved global LV systolic function, left ventricular hypertrophy, and no EKG evidence of ischemia during dobutamine stress.  He has no device related complaints.                    PRIOR MEDICATIONS    Current Outpatient Medications on File Prior to Visit   Medication Sig Dispense Refill    apixaban (ELIQUIS) 5 MG tablet  tablet Take 1 tablet by mouth 2 (Two) Times a Day. 180 tablet 1    flecainide (TAMBOCOR) 50 MG tablet Take 1 tablet by mouth 2 (Two) Times a Day.      metoprolol tartrate (LOPRESSOR) 50 MG tablet Take 1 tablet by mouth 2 (Two) Times a Day. 180 tablet 1    tadalafil (Cialis) 20 MG tablet Take 1 tablet by mouth Daily. Take 1/2-1 tablet daily as directed (Patient taking differently: 30 mg. Twice weekly) 90 tablet 0    [DISCONTINUED] prednisoLONE acetate (PRED FORTE) 1 % ophthalmic suspension   AFTER SURGERY INSTILL ONE DROP EVERY 2 HOURS TIL BED, DAY TWO USE ONE DROP FOUR TIMES DAILY FOR 14 DAYS THEN INSTILL ONE DROP TWICE DAILY FOR 14 DAYS (Patient not taking: Reported on 5/2/2025)       No current facility-administered medications on file prior to visit.       ALLERGIES:    Sulfamethoxazole-trimethoprim and Sulfa antibiotics    PAST MEDICAL HISTORY:    Past Medical History:   Diagnosis Date    Allergic     Arthritis     Asthma     VALLE (dyspnea on exertion)     Hyperlipidemia     Palpitation     Syncope and collapse        SURGICAL HISTORY:    Past Surgical History:   Procedure Laterality Date    CATARACT EXTRACTION      COLONOSCOPY      FOOT SURGERY      PACEMAKER IMPLANTATION Left     ROTATOR CUFF REPAIR      x 2       SOCIAL HISTORY:    Social History     Socioeconomic History    Marital status:    Tobacco Use    Smoking status: Never    Smokeless tobacco: Never   Vaping Use    Vaping status: Never Used   Substance and Sexual Activity    Alcohol use: No    Drug use: No    Sexual activity: Defer       FAMILY HISTORY:    Family History   Problem Relation Age of Onset    Heart disease Father     Diabetes Father     Heart disease Mother        Review of Systems   Constitutional:  Positive for fatigue.   HENT: Negative.     Eyes:  Positive for visual disturbance (glasses prn).   Respiratory:  Positive for shortness of breath (with over exertion).    Cardiovascular: Negative.    Gastrointestinal: Negative.   "  Endocrine: Negative.    Genitourinary: Negative.    Musculoskeletal:  Positive for arthralgias and myalgias.   Skin: Negative.    Allergic/Immunologic: Negative.    Neurological:  Positive for dizziness and light-headedness. Negative for syncope.   Hematological:  Bruises/bleeds easily.   Psychiatric/Behavioral: Negative.         VISIT VITALS:  Vitals:    05/20/25 1256   BP: 94/63   BP Location: Left arm   Patient Position: Sitting   Pulse: 60   SpO2: 96%   Weight: 94.9 kg (209 lb 3.2 oz)   Height: 178 cm (70.08\")      BP 94/63 (BP Location: Left arm, Patient Position: Sitting)   Pulse 60   Ht 178 cm (70.08\")   Wt 94.9 kg (209 lb 3.2 oz)   SpO2 96%   BMI 29.95 kg/m²     RECENT LABS:    Objective       Physical Exam    Procedures      Assessment & Plan   #1.  Tachybradycardia syndrome status post pacemaker implantation.  Mr. Madison has no pacemaker related complaints and last device interrogation demonstrated less than 1% atrial fibrillation.  I have asked him to taper metoprolol over approximately 1 week and continue flecainide.  He will monitor heart rate and blood pressure at home.    2.  Mr. Armenta will follow with Dr. Min Tejada as instructed we will plan to see him in follow-up in 3 months or on an as-needed basis for symptoms as discussed at today's visit.    3.  Of note Steri-Strips were removed.  The patient implant site appears well-healed and with no visible complications.   Diagnosis Plan   1. Tachy-tyrone syndrome        2. Symptomatic bradycardia        3. RBBB        4. Presence of cardiac pacemaker        5. Precordial pain        6. Paroxysmal SVT (supraventricular tachycardia)        7. Paroxysmal atrial fibrillation        8. Palpitations        9. Mixed hyperlipidemia        10. Dyspnea on exertion        11. CODY on CPAP        12. Syncope and collapse            No follow-ups on file.         Phill Madison  reports that he has never smoked. He has never used smokeless tobacco. I have " educated him on the risk of diseases from using tobacco products such as cancer, COPD, and heart disease.       Advance Care Planning   ACP discussion was held with the patient during this visit. Patient has an advance directive (not in EMR), copy requested.              DO YOU VAPE? NO                    Electronically signed by:    Scribed for Seth Davidson MD by Heidy Lester LPN on May 20, 2025  at 13:01 EDT    I, Seth Davidson MD personally performed the services described in this documentation as scribed by the above named individual in my presence, and it is both accurate and complete. May 20, 2025 13:01 EDT      Dictated Utilizing Dragon Dictation: Part of this note may be an electronic transcription/translation of spoken language to printed text using the Dragon Dictation System.

## 2025-05-28 ENCOUNTER — TELEPHONE (OUTPATIENT)
Dept: CARDIOLOGY | Facility: CLINIC | Age: 70
End: 2025-05-28
Payer: MEDICARE

## 2025-05-28 DIAGNOSIS — I48.0 PAROXYSMAL ATRIAL FIBRILLATION: ICD-10-CM

## 2025-05-28 DIAGNOSIS — I47.10 PAROXYSMAL SVT (SUPRAVENTRICULAR TACHYCARDIA): ICD-10-CM

## 2025-05-28 DIAGNOSIS — I47.19 AVNRT (AV NODAL RE-ENTRY TACHYCARDIA): Primary | ICD-10-CM

## 2025-05-28 DIAGNOSIS — I49.5 TACHY-BRADY SYNDROME: ICD-10-CM

## 2025-05-28 NOTE — TELEPHONE ENCOUNTER
Pt's wife called & LVM on the triage line reporting the pt has PPM implanted a couple weeks ago and the pt is experiencing dyspnea and afib.     Called to obtain additional information.  No answer, LVM for pt to return call.

## 2025-05-28 NOTE — TELEPHONE ENCOUNTER
Received remote transmission that shows episodes of AVNRT over the weekend and Monday. No episodes of AFIB noted since 05/10/2025. Longest avnrt episode 18 mins 53 seconds, rate 155 bpm

## 2025-05-30 RX ORDER — METOPROLOL TARTRATE 25 MG/1
25 TABLET, FILM COATED ORAL 2 TIMES DAILY
Qty: 60 TABLET | Refills: 11 | Status: SHIPPED | OUTPATIENT
Start: 2025-05-30

## 2025-05-30 RX ORDER — FLECAINIDE ACETATE 50 MG/1
TABLET ORAL
Qty: 90 TABLET | Refills: 11 | Status: SHIPPED | OUTPATIENT
Start: 2025-05-30

## 2025-05-30 NOTE — TELEPHONE ENCOUNTER
"Wife returned call. She reports patient is tired and exhausted. \"He is not well.\" He reports shortness of breath with minimal activity, and he can, \"barely walk across the living room\", due to shortness of breath. She verbalizes being very concerned about him. He has denied edema,cp.       "

## 2025-05-30 NOTE — TELEPHONE ENCOUNTER
PER DR. LAU: We will increase flecainide to 75 mg twice daily starting tomorrow morning with EKG on Monday to check change in QTc. Additionally, because of the patient's hypotension, we will decrease metoprolol to 25 mg twice daily. We will also make an appointment for the patient to follow-up with EP.     CALLED AND RELAYED ALL OF ABOVE TO Meggan JAREK AND EVEN HAD HIM WRITE THE INSTRUCTIONS DOWN FOR CONFIRMATION. UPDATED SCRIPTS SENT TO PHARM. DISCUSSED EP REFERRAL ALSO WITH MR. TILLEY. VERBALIZED HE UNDERSTOOD. PH,LPN

## 2025-05-30 NOTE — TELEPHONE ENCOUNTER
Per remote transmission received this morning, on 05/29/2025 at 9:30 a.m. patient had a 3 hour 35 minutes  episode of AVNRT, rate 156 bpm. Called patient to inquire on symptoms, no answer. Called wives phone no answer and unable to leave message. Report is in [facility] for review.

## 2025-06-02 ENCOUNTER — CLINICAL SUPPORT (OUTPATIENT)
Dept: CARDIOLOGY | Facility: CLINIC | Age: 70
End: 2025-06-02
Payer: MEDICARE

## 2025-06-02 VITALS
DIASTOLIC BLOOD PRESSURE: 70 MMHG | HEART RATE: 64 BPM | OXYGEN SATURATION: 95 % | SYSTOLIC BLOOD PRESSURE: 116 MMHG | BODY MASS INDEX: 29.98 KG/M2 | WEIGHT: 209.4 LBS | HEIGHT: 70 IN

## 2025-06-02 DIAGNOSIS — I47.19 AVNRT (AV NODAL RE-ENTRY TACHYCARDIA): Primary | ICD-10-CM

## 2025-06-02 PROCEDURE — 93000 ELECTROCARDIOGRAM COMPLETE: CPT | Performed by: INTERNAL MEDICINE

## 2025-06-02 NOTE — PROGRESS NOTES
Phill SOLIZ Los  1955 6/2/2025   ?   Chief Complaint   Patient presents with    Rapid Heart Rate     HX AVNRT per device and here for NV EKG due to increased flec. Dosing started Sat.       ?   HPI:   ? HERE FOR N/V EKG. RECENT REMOTE DEVICE INTERROGATION INDICATED ANVRT AND FLECAINIDE WAS INCREASED ON SAT. TO 75 MG PO BID. WIFE CONCERNED WITH HIS SOB. PATIENT DENIES SOB AND STATES WITH EXERTION HE HAS WHAT WOULD BE EXPECTED. STATES HE FEELS GREAT. STATES HE GOT UP AT 6:00 AM TODAY AND CUT HAY FOR 2 HOURS WITHOUT ISSUES. HE IS LEAVING FOR [facility] ON 6- AND WOULD LIKE TO GET LEVELED OUT PRIOR TO THIS. EKG DONE AND TO BE REVIEWED BY DR. DAVIDSON. PH,LPN  ?   ?     Current Outpatient Medications:     apixaban (ELIQUIS) 5 MG tablet tablet, Take 1 tablet by mouth 2 (Two) Times a Day., Disp: 180 tablet, Rfl: 1    flecainide (TAMBOCOR) 50 MG tablet, INCREASE FLECAINIDE TO 75 MG PO BID. TAKE FIRST DOSE SAT. AM, Disp: 90 tablet, Rfl: 11    metoprolol tartrate (LOPRESSOR) 25 MG tablet, Take 1 tablet by mouth 2 (Two) Times a Day., Disp: 60 tablet, Rfl: 11    tadalafil (Cialis) 20 MG tablet, Take 1 tablet by mouth Daily. Take 1/2-1 tablet daily as directed (Patient taking differently: 30 mg. Twice weekly), Disp: 90 tablet, Rfl: 0   ?   ?   Sulfamethoxazole-trimethoprim and Sulfa antibiotics         ECG 12 Lead    Date/Time: 6/2/2025 10:38 AM  Performed by: Seth Davidson MD    Authorized by: Seth Davidson MD  Comparison: not compared with previous ECG            ?   Assessment & Plan      AVNRT, ON FLEC.     EKG REVIEWED BY DR. DAVIDSON, NO NEW ORDERS AND SCHEDULE SOONER 1 MO. F/U APPT. APPT. SCHEDULED FOR PRIOR TO LEAVING FOR [facility]. PHRASHMI  ?   ?   ?

## 2025-06-06 ENCOUNTER — OFFICE VISIT (OUTPATIENT)
Dept: CARDIOLOGY | Facility: CLINIC | Age: 70
End: 2025-06-06
Payer: MEDICARE

## 2025-06-06 DIAGNOSIS — I49.5 TACHY-BRADY SYNDROME: Primary | ICD-10-CM

## 2025-06-25 ENCOUNTER — OFFICE VISIT (OUTPATIENT)
Dept: CARDIOLOGY | Facility: CLINIC | Age: 70
End: 2025-06-25
Payer: MEDICARE

## 2025-06-25 VITALS
BODY MASS INDEX: 30.32 KG/M2 | HEIGHT: 70 IN | DIASTOLIC BLOOD PRESSURE: 68 MMHG | WEIGHT: 211.8 LBS | OXYGEN SATURATION: 95 % | SYSTOLIC BLOOD PRESSURE: 104 MMHG | HEART RATE: 80 BPM

## 2025-06-25 DIAGNOSIS — R00.1 SYMPTOMATIC BRADYCARDIA: ICD-10-CM

## 2025-06-25 DIAGNOSIS — R55 SYNCOPE AND COLLAPSE: ICD-10-CM

## 2025-06-25 DIAGNOSIS — G47.33 OSA ON CPAP: ICD-10-CM

## 2025-06-25 DIAGNOSIS — I47.10 PAROXYSMAL SVT (SUPRAVENTRICULAR TACHYCARDIA): ICD-10-CM

## 2025-06-25 DIAGNOSIS — R07.2 PRECORDIAL PAIN: ICD-10-CM

## 2025-06-25 DIAGNOSIS — Z95.0 PRESENCE OF CARDIAC PACEMAKER: ICD-10-CM

## 2025-06-25 DIAGNOSIS — E78.2 MIXED HYPERLIPIDEMIA: ICD-10-CM

## 2025-06-25 DIAGNOSIS — I49.5 TACHY-BRADY SYNDROME: Primary | ICD-10-CM

## 2025-06-25 DIAGNOSIS — R06.09 DYSPNEA ON EXERTION: ICD-10-CM

## 2025-06-25 DIAGNOSIS — R00.2 PALPITATIONS: ICD-10-CM

## 2025-06-25 DIAGNOSIS — I45.10 RBBB: ICD-10-CM

## 2025-06-25 DIAGNOSIS — I48.0 PAROXYSMAL ATRIAL FIBRILLATION: ICD-10-CM

## 2025-06-25 RX ORDER — METOPROLOL TARTRATE 25 MG/1
12.5 TABLET, FILM COATED ORAL 2 TIMES DAILY
Start: 2025-06-25

## 2025-06-25 NOTE — PROGRESS NOTES
Subjective   Phill Madison is a 69 y.o. male     Chief Complaint   Patient presents with    Follow-up     Here for f/u from last visit    Rapid Heart Rate    Hyperlipidemia    Palpitations    Slow Heart Rate    Sleep Apnea    Syncope       PROBLEM LIST:     1. Hyperlipidemia  2. VALLE  3. Palpitations  3.1 Event monitor, 3- - 4-9-2022, sinus with sustained runs SVT freq. > 1 min with rate to 165, occas. PVC'S, occas. NSVT. TX'D with Flec. Referred to EP  4. Syncope and collapse, last episode approx. 2 yrs. Ago  4.1 Tlit Table, 8-1-17, pos. For neurocardiogenic syncope  4.2 Dual chamber Abbott PPM per Dr. Balderrama on 4-  5. Asthma 25 yrs. ago, reports cured with acupuncture  6. RBBB per EKG  7. CODY, uses CPAP  8. Echo, 5-7-2025. EF 61-65%, mild LVH, grade 1 DD, pulm. Pressures < 35 mmHg  9. A-fib / flutter per event monitors. On Eliquis.     Specialty Problems          Cardiology Problems    Mixed hyperlipidemia        Palpitations        RBBB        Symptomatic bradycardia        Tachy-tyrone syndrome        Paroxysmal atrial fibrillation        Paroxysmal SVT (supraventricular tachycardia)        Presence of cardiac pacemaker             HPI:  Mr. Madison returns for follow-up on the above.    He feels well although he states he does not have the degree of functional capacity he would desire.  He described that he was able to work vigorously for 4 hours in the heat of the day yesterday without difficulty.  He felt fatigued at the end of the day.  However, this morning he felt weak and fatigued with mild exertional dyspnea.  He has minimal lightheadedness and no presyncope or syncope.    Event monitor was reviewed.  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                    PRIOR MEDICATIONS    Current Outpatient Medications on File Prior to Visit   Medication Sig Dispense Refill    apixaban (ELIQUIS) 5 MG tablet tablet Take 1 tablet  by mouth 2 (Two) Times a Day. 180 tablet 1    flecainide (TAMBOCOR) 50 MG tablet INCREASE FLECAINIDE TO 75 MG PO BID. TAKE FIRST DOSE SAT. AM (Patient taking differently: Take 1.5 tablets by mouth 2 (Two) Times a Day.) 90 tablet 11    metoprolol tartrate (LOPRESSOR) 25 MG tablet Take 1 tablet by mouth 2 (Two) Times a Day. 60 tablet 11    tadalafil (Cialis) 20 MG tablet Take 1 tablet by mouth Daily. Take 1/2-1 tablet daily as directed (Patient taking differently: 30 mg. Twice weekly) 90 tablet 0     No current facility-administered medications on file prior to visit.       ALLERGIES:    Sulfamethoxazole-trimethoprim and Sulfa antibiotics    PAST MEDICAL HISTORY:    Past Medical History:   Diagnosis Date    Allergic     Arthritis     Asthma     VALLE (dyspnea on exertion)     Hyperlipidemia     Palpitation     Syncope and collapse        SURGICAL HISTORY:    Past Surgical History:   Procedure Laterality Date    CATARACT EXTRACTION      COLONOSCOPY      FOOT SURGERY      PACEMAKER IMPLANTATION Left     ROTATOR CUFF REPAIR      x 2       SOCIAL HISTORY:    Social History     Socioeconomic History    Marital status:    Tobacco Use    Smoking status: Never    Smokeless tobacco: Never   Vaping Use    Vaping status: Never Used   Substance and Sexual Activity    Alcohol use: No    Drug use: No    Sexual activity: Defer       FAMILY HISTORY:    Family History   Problem Relation Age of Onset    Heart disease Father     Diabetes Father     Heart disease Mother        Review of Systems   Constitutional:  Positive for fatigue (off and on).   HENT: Negative.     Eyes:  Positive for visual disturbance (glasses).   Respiratory:  Positive for shortness of breath.    Cardiovascular: Negative.    Gastrointestinal: Negative.    Endocrine: Negative.    Genitourinary: Negative.    Musculoskeletal:  Positive for arthralgias and myalgias.   Skin: Negative.    Allergic/Immunologic: Negative.    Neurological:  Positive for  "light-headedness (occas.). Negative for syncope.   Hematological: Negative.    Psychiatric/Behavioral: Negative.         VISIT VITALS:  Vitals:    06/25/25 1400   BP: 104/68   BP Location: Left arm   Patient Position: Sitting   Pulse: 80   SpO2: 95%   Weight: 96.1 kg (211 lb 12.8 oz)   Height: 178 cm (70.08\")      /68 (BP Location: Left arm, Patient Position: Sitting)   Pulse 80   Ht 178 cm (70.08\")   Wt 96.1 kg (211 lb 12.8 oz)   SpO2 95%   BMI 30.32 kg/m²     RECENT LABS:    Objective       Physical Exam    Procedures      Assessment & Plan   #1.  Conduction system disease.  The patient has minimal persistent dysrhythmia on current dose of flecainide.  He had 1 short run of AVNRT and no atrial fibrillation.  We will continue current therapy.    2.  Fatigue and exertional dyspnea.  Recent stress echo demonstrated no ischemia with preserved LV systolic function.  Therefore, I suspect that exertional dyspnea is multifactorial with physical deconditioning, mild diastolic dysfunction, mild hypotension  and possibly medication all playing a role.  I would like to decrease metoprolol to 12.5 mg twice daily given the absence of A-fib and minimal SVT.  Once the patient returns from [place] if he has done well we will stop beta-blocker completely.    3.  Conduction system disease status post dual chamber pacemaker implantation.  There is normal function on most recent interrogation.    4.  Syncope with positive tilt table test.  We will need to follow patient's symptoms closely particularly if beta-blocker is completely discontinued.  I will also try to review records to see if he had a vasodepressor or cardioinhibitory response to tilt table testing.    5.  Mr. Madison will follow with Dr. Min Tejada as instructed and we will plan on seeing him in follow-up as scheduled or on an as needed basis as discussed   Diagnosis Plan   1. Tachy-tyrone syndrome        2. Symptomatic bradycardia        3. RBBB        4. " Presence of cardiac pacemaker        5. Precordial pain        6. Paroxysmal SVT (supraventricular tachycardia)        7. Paroxysmal atrial fibrillation        8. Palpitations        9. Mixed hyperlipidemia        10. Dyspnea on exertion        11. CODY on CPAP        12. Syncope and collapse            No follow-ups on file.         Phill Madison  reports that he has never smoked. He has never used smokeless tobacco. I have educated him on the risk of diseases from using tobacco products such as cancer, COPD, and heart disease.       Advance Care Planning   ACP discussion was held with the patient during this visit. Patient has an advance directive (not in EMR), copy requested.              DO YOU VAPE? NO                    Electronically signed by:    Scribed for Seth Davidson MD by Heidy Lester LPN on June 25, 2025  at 14:04 EDT    I, Seth Davidson MD personally performed the services described in this documentation as scribed by the above named individual in my presence, and it is both accurate and complete. June 25, 2025 14:04 EDT      Dictated Utilizing Dragon Dictation: Part of this note may be an electronic transcription/translation of spoken language to printed text using the Dragon Dictation System.

## 2025-06-27 ENCOUNTER — TELEPHONE (OUTPATIENT)
Dept: CARDIOLOGY | Facility: CLINIC | Age: 70
End: 2025-06-27
Payer: MEDICARE

## 2025-07-17 ENCOUNTER — TELEPHONE (OUTPATIENT)
Dept: CARDIOLOGY | Facility: CLINIC | Age: 70
End: 2025-07-17
Payer: MEDICARE

## 2025-07-17 NOTE — PROGRESS NOTES
Cardiac Electrophysiology Outpatient Note  [facility]    Office Visit     Phill Madison  [id number]  07/18/2025    Primary Care Physician: Min Kelly MD    Referred By: Seth Davidson MD    Subjective     Chief Complaint   Patient presents with    AVNRT     Problem list:  Tachybradycardia syndrome  [facility] dual-chamber pacemaker with [facility] RV lead Dr. Balderrama 4/2025  Paroxysmal SVT  On flecainide  TTE 5/2025: LVEF 61-65%, mild concentric LVH, grade 1 diastolic dysfunction, mild biatrial dilation, RVSP <35  Stress echo 5/2025: No electrocardiographic or echocardiographic evidence of ischemia  Paroxysmal atrial fibrillation  Dyslipidemia  CODY on CPAP          History of Present Illness:   Phill Madison is a 69 y.o. male who presents to my electrophysiology clinic as a referral from Dr. Wakefield for tachybradycardia syndrome s/p pacemaker, SVT and atrial fibrillation.  He had a 3 and half hour episode of SVT at 156 bpm 5/29/2025.     I last saw the patient in 2022.  At that point he had had some episodes of SVT on his monitor, that were likely AV node reentry.  He was overall doing well and he elected to continue flecainide.  He has a longstanding history of presyncope and syncopal events.  He thinks these are becoming more frequent.  He has noted a lot more presyncope with changing positions.  With this he will feel very lightheaded, dizzy often times vision will become shaky.  Did not necessarily feel as though things were spinning around him.  Has not recently had a true syncopal event.  Has not had significant palpitations.  Was started on Eliquis a couple months ago..      Past Medical History:   Diagnosis Date    Allergic     Arthritis     Asthma     VALLE (dyspnea on exertion)     Hyperlipidemia     Palpitation     Syncope and collapse        Past Surgical History:   Procedure Laterality Date    CATARACT EXTRACTION      COLONOSCOPY      FOOT SURGERY       "PACEMAKER IMPLANTATION Left     ROTATOR CUFF REPAIR      x 2       Family History   Problem Relation Age of Onset    Heart disease Father     Diabetes Father     Heart disease Mother        Social History     Socioeconomic History    Marital status:    Tobacco Use    Smoking status: Never    Smokeless tobacco: Never   Vaping Use    Vaping status: Never Used   Substance and Sexual Activity    Alcohol use: No    Drug use: No    Sexual activity: Defer         Current Outpatient Medications:     apixaban (ELIQUIS) 5 MG tablet tablet, Take 1 tablet by mouth 2 (Two) Times a Day., Disp: 180 tablet, Rfl: 1    metoprolol tartrate (LOPRESSOR) 25 MG tablet, Take 0.5 tablets by mouth 2 (Two) Times a Day., Disp: , Rfl:     tadalafil (Cialis) 20 MG tablet, Take 1 tablet by mouth Daily. Take 1/2-1 tablet daily as directed (Patient taking differently: 30 mg. Twice weekly), Disp: 90 tablet, Rfl: 0    Allergies:   Allergies   Allergen Reactions    Sulfamethoxazole-Trimethoprim Itching    Sulfa Antibiotics Hives       Objective   Vital Signs: Blood pressure 99/61, pulse 60, height 177.8 cm (70\"), weight 96.2 kg (212 lb), SpO2 92%.    PHYSICAL EXAM  General appearance: Awake, alert, cooperative  Head: Normocephalic, without obvious abnormality, atraumatic  Neck: No JVD  Lungs: Clear to ascultation bilaterally  Heart: Regular rate and rhythm, no murmurs, 2+ LE pulses, no lower extremity swelling  Skin: Skin color, turgor normal, no rashes or lesions  Neurologic: Grossly normal     Lab Results   Component Value Date    GLUCOSE 100 (H) 12/05/2023    CALCIUM 9.4 12/05/2023     12/05/2023    K 3.8 12/05/2023    CO2 28.1 12/05/2023     12/05/2023    BUN 11 12/05/2023    CREATININE 0.97 12/05/2023    BCR 11.3 12/05/2023    ANIONGAP 9.9 12/05/2023     Lab Results   Component Value Date    WBC 6.00 12/06/2023    HGB 15.2 12/06/2023    HCT 47.1 12/06/2023    MCV 93.3 12/06/2023     12/06/2023     No results found for: " "\"INR\", \"PROTIME\"  No results found for: \"TSH\", \"K3QTTAM\", \"X5ZGFNU\", \"THYROIDAB\"       Results for orders placed during the hospital encounter of 05/15/25    Adult Stress Echo W/ Cont or Stress Agent if Necessary Per Protocol 05/27/2025 11:44 AM    Interpretation Summary  1.  The patient was infused with dobutamine using standard protocols to a heart rate of 164, systolic blood pressure 132 into 109% of age-adjusted maximal predicted heart rate.  No significant symptoms were identified.    2.  Baseline EKG demonstrated a sinus mechanism at 60 bpm with borderline first-degree AV block, nonspecific IVCD right bundle branch block type, and ventricular ectopy including a ventricular couplet.  Ectopy was suppressed at higher heart rates and recurred in recovery.  There were no diagnostic EKG changes.  No pauses.    3.  No echocardiographic evidence of ischemia.  There is normal augmentation of wall motion in all segments with a decrease in left ventricular end-systolic dimensions.    Summary: No electrocardiographic or echocardiographic evidence of ischemia.         I personally viewed and interpreted the patient's EKG/Telemetry/lab data      ECG 12 Lead    Date/Time: 7/18/2025 12:28 PM  Performed by: Tj Montano MD    Authorized by: Tj Montano MD  Comparison: compared with previous ECG from 6/2/2025  Similar to previous ECG  Comments: Atrial paced rhythm, right bundle branch block          Phill Madison  reports that he has never smoked. He has never used smokeless tobacco.     Advance Care Planning   Advance Care Planning: ACP discussion was held with the patient during this visit. Patient does not have an advance directive, information provided.     Assessment & Plan    1. Dizziness  Patient's main complaint is episodic dizziness.  He has had syncopal episodes in the past as well as a positive tilt table for neurocardiogenic syncope.  Overall his current dizziness is likely multifactorial.  Clearly has symptoms " that seem typical with orthostasis, neurocardiogenic episodes may also be contributing.  Also could have contributions from medications, most likely flecainide, as well as episodic tachycardia.    We discussed options for this going forward.  I think the easiest thing would be to trial stopping flecainide to see if this is contributing to his dizziness.  As below, I also have some concerns with his EKG morphology with the flecainide.  We also discussed aggressive hydration, salt replenishment, as well as compression garments (including up to abdominal compression).  He is agreeable to a trial of this and stopping flecainide, and he will let us know how he is doing in a few weeks    2. Paroxysmal SVT (supraventricular tachycardia)  Patient has a longstanding history of paroxysmal SVT.  These appear to be short episodes based on prior monitor and pacemaker interrogation today.  Longest episode on his pacemaker today was approximately 10 minutes.  This appeared to be a one-to-one tachycardia 160 bpm.  Onset and offset of this was not apparent.  Still difficult to tell whether this is an atrial tachycardia versus AV node reentry.  We will need to see if this worsens after stopping flecainide.  We discussed the option of proceeding with an ablation to further clarify the mechanism of tachycardia and he will think about this.    3. RBBB  Patient also has a right bundle branch block, uncertain how much flecainide is contributing to this.  I did review his prior stress test, that he had more of coved ST elevation appearance in V1 that seem to worsen with exercise.  This gives me some concern that he may have too much sodium channel blockade, and as above we will trial stopping flecainide.    4. Presence of cardiac pacemaker  Underwent recent pacemaker implantation by Dr. Balderrama.  Reportedly this was left bundle branch area pacing lead, but on review of EKG from 6-25, likely does not meet criteria (no R prime, LV  activation time 95 ms).  Still likely a septal pacing lead.    Pacemaker was interrogated and is functioning normally.  He has approximately 8 to 9 years of battery life remaining.  He is pacing 92% of time in the atrium and 35% of the time in the ventricle.  Will continue to monitor.  Ventricular pacing could decrease as we come off of flecainide.  As above several episodes of SVT.  Also 2 short episodes that appear consistent with atrial fibrillation, both less than 1 minute.  Will need to monitor this closely.  He is already on Eliquis.       Follow Up:  No follow-ups on file.      Thank you for allowing me to participate in the care of your patient. Please do not hesitate to contact me with additional questions or concerns.      Tj Montano M.D.  Cardiac Electrophysiologist  [facility]

## 2025-07-18 ENCOUNTER — OFFICE VISIT (OUTPATIENT)
Dept: CARDIOLOGY | Facility: CLINIC | Age: 70
End: 2025-07-18
Payer: MEDICARE

## 2025-07-18 VITALS
OXYGEN SATURATION: 92 % | BODY MASS INDEX: 30.35 KG/M2 | HEIGHT: 70 IN | DIASTOLIC BLOOD PRESSURE: 61 MMHG | WEIGHT: 212 LBS | SYSTOLIC BLOOD PRESSURE: 99 MMHG | HEART RATE: 60 BPM

## 2025-07-18 DIAGNOSIS — I45.10 RBBB: ICD-10-CM

## 2025-07-18 DIAGNOSIS — I47.10 PAROXYSMAL SVT (SUPRAVENTRICULAR TACHYCARDIA): ICD-10-CM

## 2025-07-18 DIAGNOSIS — R42 DIZZINESS: Primary | ICD-10-CM

## 2025-07-18 DIAGNOSIS — Z95.0 PRESENCE OF CARDIAC PACEMAKER: ICD-10-CM

## 2025-08-05 LAB
MDC_IDC_MSMT_BATTERY_REMAINING_LONGEVITY: 104 MO
MDC_IDC_MSMT_BATTERY_REMAINING_PERCENTAGE: 95.5 %
MDC_IDC_MSMT_BATTERY_RRT_TRIGGER: 2.6
MDC_IDC_MSMT_BATTERY_STATUS: NORMAL
MDC_IDC_MSMT_BATTERY_VOLTAGE: 3.01
MDC_IDC_MSMT_LEADCHNL_RA_DTM: NORMAL
MDC_IDC_MSMT_LEADCHNL_RA_IMPEDANCE_VALUE: 430
MDC_IDC_MSMT_LEADCHNL_RA_PACING_THRESHOLD_AMPLITUDE: 0.62
MDC_IDC_MSMT_LEADCHNL_RA_PACING_THRESHOLD_POLARITY: NORMAL
MDC_IDC_MSMT_LEADCHNL_RA_PACING_THRESHOLD_PULSEWIDTH: 0.5
MDC_IDC_MSMT_LEADCHNL_RA_SENSING_INTR_AMPL: 5
MDC_IDC_MSMT_LEADCHNL_RV_IMPEDANCE_VALUE: 780
MDC_IDC_MSMT_LEADCHNL_RV_PACING_THRESHOLD_POLARITY: NORMAL
MDC_IDC_MSMT_LEADCHNL_RV_SENSING_INTR_AMPL: 12
MDC_IDC_PG_IMPLANT_DTM: NORMAL
MDC_IDC_PG_MFG: NORMAL
MDC_IDC_PG_MODEL: NORMAL
MDC_IDC_PG_SERIAL: NORMAL
MDC_IDC_PG_TYPE: NORMAL
MDC_IDC_SESS_DTM: NORMAL
MDC_IDC_SESS_TYPE: NORMAL
MDC_IDC_SET_BRADY_AT_MODE_SWITCH_RATE: 180
MDC_IDC_SET_BRADY_LOWRATE: 60
MDC_IDC_SET_BRADY_MAX_SENSOR_RATE: 120
MDC_IDC_SET_BRADY_MAX_TRACKING_RATE: 120
MDC_IDC_SET_BRADY_MODE: NORMAL
MDC_IDC_SET_BRADY_PAV_DELAY: 250
MDC_IDC_SET_BRADY_SAV_DELAY: 200
MDC_IDC_SET_LEADCHNL_RA_PACING_AMPLITUDE: 2
MDC_IDC_SET_LEADCHNL_RA_PACING_POLARITY: NORMAL
MDC_IDC_SET_LEADCHNL_RA_PACING_PULSEWIDTH: 0.5
MDC_IDC_SET_LEADCHNL_RA_SENSING_POLARITY: NORMAL
MDC_IDC_SET_LEADCHNL_RA_SENSING_SENSITIVITY: 0.5
MDC_IDC_SET_LEADCHNL_RV_PACING_AMPLITUDE: 2.5
MDC_IDC_SET_LEADCHNL_RV_PACING_POLARITY: NORMAL
MDC_IDC_SET_LEADCHNL_RV_PACING_PULSEWIDTH: 0.5
MDC_IDC_SET_LEADCHNL_RV_SENSING_POLARITY: NORMAL
MDC_IDC_SET_LEADCHNL_RV_SENSING_SENSITIVITY: 2
MDC_IDC_STAT_AT_BURDEN_PERCENT: 1
MDC_IDC_STAT_BRADY_RA_PERCENT_PACED: 81
MDC_IDC_STAT_BRADY_RV_PERCENT_PACED: 43

## 2025-08-06 LAB
MDC_IDC_MSMT_BATTERY_REMAINING_LONGEVITY: 104 MO
MDC_IDC_MSMT_BATTERY_REMAINING_PERCENTAGE: 95.5 %
MDC_IDC_MSMT_BATTERY_RRT_TRIGGER: 2.6
MDC_IDC_MSMT_BATTERY_STATUS: NORMAL
MDC_IDC_MSMT_BATTERY_VOLTAGE: 3.01
MDC_IDC_MSMT_LEADCHNL_RA_DTM: NORMAL
MDC_IDC_MSMT_LEADCHNL_RA_IMPEDANCE_VALUE: 430
MDC_IDC_MSMT_LEADCHNL_RA_PACING_THRESHOLD_AMPLITUDE: 0.62
MDC_IDC_MSMT_LEADCHNL_RA_PACING_THRESHOLD_POLARITY: NORMAL
MDC_IDC_MSMT_LEADCHNL_RA_PACING_THRESHOLD_PULSEWIDTH: 0.5
MDC_IDC_MSMT_LEADCHNL_RA_SENSING_INTR_AMPL: 5
MDC_IDC_MSMT_LEADCHNL_RV_IMPEDANCE_VALUE: 760
MDC_IDC_MSMT_LEADCHNL_RV_PACING_THRESHOLD_POLARITY: NORMAL
MDC_IDC_MSMT_LEADCHNL_RV_SENSING_INTR_AMPL: 12
MDC_IDC_PG_IMPLANT_DTM: NORMAL
MDC_IDC_PG_MFG: NORMAL
MDC_IDC_PG_MODEL: NORMAL
MDC_IDC_PG_SERIAL: NORMAL
MDC_IDC_PG_TYPE: NORMAL
MDC_IDC_SESS_DTM: NORMAL
MDC_IDC_SESS_TYPE: NORMAL
MDC_IDC_SET_BRADY_AT_MODE_SWITCH_RATE: 180
MDC_IDC_SET_BRADY_LOWRATE: 60
MDC_IDC_SET_BRADY_MAX_SENSOR_RATE: 120
MDC_IDC_SET_BRADY_MAX_TRACKING_RATE: 120
MDC_IDC_SET_BRADY_MODE: NORMAL
MDC_IDC_SET_BRADY_PAV_DELAY: 250
MDC_IDC_SET_BRADY_SAV_DELAY: 200
MDC_IDC_SET_LEADCHNL_RA_PACING_AMPLITUDE: 2
MDC_IDC_SET_LEADCHNL_RA_PACING_POLARITY: NORMAL
MDC_IDC_SET_LEADCHNL_RA_PACING_PULSEWIDTH: 0.5
MDC_IDC_SET_LEADCHNL_RA_SENSING_POLARITY: NORMAL
MDC_IDC_SET_LEADCHNL_RA_SENSING_SENSITIVITY: 0.5
MDC_IDC_SET_LEADCHNL_RV_PACING_AMPLITUDE: 2.5
MDC_IDC_SET_LEADCHNL_RV_PACING_POLARITY: NORMAL
MDC_IDC_SET_LEADCHNL_RV_PACING_PULSEWIDTH: 0.5
MDC_IDC_SET_LEADCHNL_RV_SENSING_POLARITY: NORMAL
MDC_IDC_SET_LEADCHNL_RV_SENSING_SENSITIVITY: 2
MDC_IDC_STAT_AT_BURDEN_PERCENT: 1
MDC_IDC_STAT_BRADY_RA_PERCENT_PACED: 80
MDC_IDC_STAT_BRADY_RV_PERCENT_PACED: 42

## 2025-08-28 ENCOUNTER — OFFICE VISIT (OUTPATIENT)
Dept: CARDIOLOGY | Facility: CLINIC | Age: 70
End: 2025-08-28
Payer: MEDICARE

## 2025-08-28 VITALS
WEIGHT: 213.2 LBS | DIASTOLIC BLOOD PRESSURE: 84 MMHG | OXYGEN SATURATION: 93 % | BODY MASS INDEX: 30.52 KG/M2 | HEIGHT: 70 IN | HEART RATE: 84 BPM | SYSTOLIC BLOOD PRESSURE: 119 MMHG

## 2025-08-28 DIAGNOSIS — R06.09 DYSPNEA ON EXERTION: ICD-10-CM

## 2025-08-28 DIAGNOSIS — E78.2 MIXED HYPERLIPIDEMIA: ICD-10-CM

## 2025-08-28 DIAGNOSIS — R00.2 PALPITATIONS: ICD-10-CM

## 2025-08-28 DIAGNOSIS — I49.5 TACHY-BRADY SYNDROME: Primary | ICD-10-CM

## 2025-08-28 DIAGNOSIS — I47.10 PAROXYSMAL SVT (SUPRAVENTRICULAR TACHYCARDIA): ICD-10-CM

## 2025-08-28 DIAGNOSIS — Z95.0 PRESENCE OF CARDIAC PACEMAKER: ICD-10-CM

## 2025-08-28 DIAGNOSIS — I45.10 RBBB: ICD-10-CM

## 2025-08-28 DIAGNOSIS — R00.1 SYMPTOMATIC BRADYCARDIA: ICD-10-CM

## 2025-08-28 DIAGNOSIS — R55 SYNCOPE AND COLLAPSE: ICD-10-CM

## 2025-08-28 DIAGNOSIS — R07.2 PRECORDIAL PAIN: ICD-10-CM

## 2025-08-28 DIAGNOSIS — R06.02 SHORTNESS OF BREATH: ICD-10-CM

## 2025-08-28 DIAGNOSIS — G47.33 OSA ON CPAP: ICD-10-CM

## 2025-08-28 DIAGNOSIS — I48.0 PAROXYSMAL ATRIAL FIBRILLATION: ICD-10-CM

## 2025-08-28 RX ORDER — DOXYCYCLINE 100 MG/1
100 CAPSULE ORAL 2 TIMES DAILY
COMMUNITY
Start: 2025-08-12

## 2025-08-28 RX ORDER — METOPROLOL TARTRATE 25 MG/1
12.5 TABLET, FILM COATED ORAL 2 TIMES DAILY
Qty: 90 TABLET | Refills: 3 | Status: SHIPPED | OUTPATIENT
Start: 2025-08-28